# Patient Record
Sex: MALE | Race: WHITE | Employment: FULL TIME | URBAN - METROPOLITAN AREA
[De-identification: names, ages, dates, MRNs, and addresses within clinical notes are randomized per-mention and may not be internally consistent; named-entity substitution may affect disease eponyms.]

---

## 2017-01-09 ENCOUNTER — MED REC SCAN ONLY (OUTPATIENT)
Dept: FAMILY MEDICINE CLINIC | Facility: CLINIC | Age: 32
End: 2017-01-09

## 2017-01-16 NOTE — TELEPHONE ENCOUNTER
Last OV 11/28/16, No future appointments. Last rx given 11/3/16 for # 270 with one refill    Per pharmacist tech at [facility], pt still has alprazolam on file that can be refilled. New rx is not needed. Pt notified by phone and verbalized understanding.  He

## 2017-04-28 ENCOUNTER — HOSPITAL ENCOUNTER (OUTPATIENT)
Age: 32
Discharge: HOME | End: 2017-04-28
Attending: FAMILY MEDICINE
Payer: COMMERCIAL

## 2017-04-28 VITALS
OXYGEN SATURATION: 100 % | RESPIRATION RATE: 18 BRPM | DIASTOLIC BLOOD PRESSURE: 78 MMHG | BODY MASS INDEX: 28.25 KG/M2 | HEIGHT: 67 IN | WEIGHT: 180 LBS | TEMPERATURE: 101 F | SYSTOLIC BLOOD PRESSURE: 134 MMHG | HEART RATE: 100 BPM

## 2017-04-28 DIAGNOSIS — A02.9 SALMONELLA: ICD-10-CM

## 2017-04-28 DIAGNOSIS — A08.4 VIRAL GASTROENTERITIS: ICD-10-CM

## 2017-04-28 DIAGNOSIS — E87.6 HYPOKALEMIA: ICD-10-CM

## 2017-04-28 DIAGNOSIS — Z86.2 HISTORY OF IMMUNOCOMPROMISED STATE: ICD-10-CM

## 2017-04-28 DIAGNOSIS — R10.9 ABDOMINAL CRAMPING: ICD-10-CM

## 2017-04-28 DIAGNOSIS — A04.72 CLOSTRIDIUM DIFFICILE COLITIS: Primary | ICD-10-CM

## 2017-04-28 PROCEDURE — 87015 SPECIMEN INFECT AGNT CONCNTJ: CPT | Performed by: FAMILY MEDICINE

## 2017-04-28 PROCEDURE — 87077 CULTURE AEROBIC IDENTIFY: CPT | Performed by: FAMILY MEDICINE

## 2017-04-28 PROCEDURE — 99215 OFFICE O/P EST HI 40 MIN: CPT

## 2017-04-28 PROCEDURE — 85025 COMPLETE CBC W/AUTO DIFF WBC: CPT | Performed by: FAMILY MEDICINE

## 2017-04-28 PROCEDURE — 87046 STOOL CULTR AEROBIC BACT EA: CPT | Performed by: FAMILY MEDICINE

## 2017-04-28 PROCEDURE — 87177 OVA AND PARASITES SMEARS: CPT | Performed by: FAMILY MEDICINE

## 2017-04-28 PROCEDURE — 87269 GIARDIA AG IF: CPT | Performed by: FAMILY MEDICINE

## 2017-04-28 PROCEDURE — 96360 HYDRATION IV INFUSION INIT: CPT

## 2017-04-28 PROCEDURE — 85027 COMPLETE CBC AUTOMATED: CPT | Performed by: FAMILY MEDICINE

## 2017-04-28 PROCEDURE — 89055 LEUKOCYTE ASSESSMENT FECAL: CPT | Performed by: FAMILY MEDICINE

## 2017-04-28 PROCEDURE — 99204 OFFICE O/P NEW MOD 45 MIN: CPT

## 2017-04-28 PROCEDURE — 87427 SHIGA-LIKE TOXIN AG IA: CPT | Performed by: FAMILY MEDICINE

## 2017-04-28 PROCEDURE — 85007 BL SMEAR W/DIFF WBC COUNT: CPT | Performed by: FAMILY MEDICINE

## 2017-04-28 PROCEDURE — 87186 SC STD MICRODIL/AGAR DIL: CPT | Performed by: FAMILY MEDICINE

## 2017-04-28 PROCEDURE — 87209 SMEAR COMPLEX STAIN: CPT | Performed by: FAMILY MEDICINE

## 2017-04-28 PROCEDURE — 87493 C DIFF AMPLIFIED PROBE: CPT | Performed by: FAMILY MEDICINE

## 2017-04-28 PROCEDURE — 87045 FECES CULTURE AEROBIC BACT: CPT | Performed by: FAMILY MEDICINE

## 2017-04-28 PROCEDURE — 96361 HYDRATE IV INFUSION ADD-ON: CPT

## 2017-04-28 PROCEDURE — 80047 BASIC METABLC PNL IONIZED CA: CPT

## 2017-04-28 PROCEDURE — 82272 OCCULT BLD FECES 1-3 TESTS: CPT | Performed by: FAMILY MEDICINE

## 2017-04-28 RX ORDER — SODIUM CHLORIDE 9 MG/ML
1000 INJECTION, SOLUTION INTRAVENOUS ONCE
Status: COMPLETED | OUTPATIENT
Start: 2017-04-28 | End: 2017-04-28

## 2017-04-28 RX ORDER — POTASSIUM CHLORIDE 20 MEQ/1
40 TABLET, EXTENDED RELEASE ORAL ONCE
Status: COMPLETED | OUTPATIENT
Start: 2017-04-28 | End: 2017-04-28

## 2017-04-28 NOTE — ED INITIAL ASSESSMENT (HPI)
Pt with c/o diarrhea that started on Wednesday night. Pt states fevers at home. Headache started today. Denies vomiting.

## 2017-04-29 RX ORDER — METRONIDAZOLE 500 MG/1
500 TABLET ORAL 3 TIMES DAILY
Qty: 30 TABLET | Refills: 0 | Status: SHIPPED | OUTPATIENT
Start: 2017-04-29 | End: 2017-05-09

## 2017-04-29 NOTE — ED NOTES
Lab called with pos juliocesar kilgore from lab aware pt is an [place] pt.   She will follow up with them

## 2017-04-29 NOTE — ED PROVIDER NOTES
Patient Seen in: [address] [facility]    History   Patient presents with:  Nausea/Vomiting/Diarrhea (gastrointestinal)    Stated Complaint: AB PAIN    HPI  36year-old male here with intermittent abdominal cramping, especially prior to moving daily.   Escitalopram Oxalate (LEXAPRO) 20 MG Oral Tab,  Take 1 Tab by mouth daily.        Family History   Problem Relation Age of Onset   • Hypertension Mother    • Cancer Paternal Grandmother    • [other] [OTHER] Paternal Grandfather      stroke and MI ISTAT Potassium 3.2 (*)     ISTAT Chloride 97 (*)     ISTAT Ionized Calcium 1.03 (*)     All other components within normal limits   CBC W AUTO DIFF   C. DIFFICILE(TOXIGENIC)PCR   OCCULT BLOOD, STOOL   STOOL CULTURE W/SHIGATOXIN    Narrative:      The follo Comments: With solids - crackers  0.9%  NaCl infusion   Sig:   Potassium Chloride ER (K-DUR M20) CR tab 40 mEq   Sig:     Reviewed all the labs with the patient. K + was creased and this is he was treated with 40 mg of potassium immediate care.   Plan of

## 2017-04-30 NOTE — ED PROVIDER NOTES
Stool studies  positive for C. difficile. Patient was called and informed about these results. Called in Flagyl 3 times a day for the next 10 days. Patient instructed not to drink alcohol for total of 13 days.   He was also instructed to practice proper

## 2017-04-30 NOTE — ED NOTES
[facility] this am states that pt was having worse cramps. Asking if it was normal. Message left to call with an questions.

## 2017-05-01 ENCOUNTER — TELEPHONE (OUTPATIENT)
Dept: FAMILY MEDICINE CLINIC | Facility: CLINIC | Age: 32
End: 2017-05-01

## 2017-05-01 RX ORDER — CIPROFLOXACIN 500 MG/1
500 TABLET, FILM COATED ORAL 2 TIMES DAILY
Qty: 20 TABLET | Refills: 0 | Status: SHIPPED | OUTPATIENT
Start: 2017-05-01 | End: 2017-05-11

## 2017-05-01 NOTE — TELEPHONE ENCOUNTER
Pt notified by phone and verbalized understanding. Pt would like to return to work this Wednesday if possible. He requested a note for work to return. Appt scheduled for 5/2/17 to re-evaluate and determine clearance status.     Future Appointments  Date Cynthia Ramsay

## 2017-05-01 NOTE — TELEPHONE ENCOUNTER
It might be a week or so, but once he gets on the flagyll for a few days it should get better, also get an probiotic to take daily, avoid dairy fresh fruits and veggies, alsohe should use his own bathroom and avoid anyone else using the same bathroom felicia

## 2017-05-01 NOTE — TELEPHONE ENCOUNTER
At [facility] on 4/28/17, pt was diagnosed with viral gastroenteritis. He was discharged on Zofran 4 mg q8h prn, BRAT diet, and increased fluids. Stool studies positive for c diff and occult blood. Patient was started on Flagyl TID x 10 days yesterday.  Pt was advi

## 2017-05-01 NOTE — ED NOTES
Received call from Garett at [facility] laboratory. Pt stool culture +Salmonella. Dr. Mike Davis made aware.

## 2017-05-01 NOTE — ED NOTES
Patient notified of lab results and to start Cipro today. Patient is following- up with PCP tomorrow. Message  Received:  Today       Jim Rico MD  [facility]                   Inform patient that stool culture was positive for Salm

## 2017-05-02 ENCOUNTER — OFFICE VISIT (OUTPATIENT)
Dept: FAMILY MEDICINE CLINIC | Facility: CLINIC | Age: 32
End: 2017-05-02

## 2017-05-02 VITALS
DIASTOLIC BLOOD PRESSURE: 88 MMHG | RESPIRATION RATE: 16 BRPM | HEART RATE: 88 BPM | WEIGHT: 170 LBS | OXYGEN SATURATION: 98 % | TEMPERATURE: 98 F | BODY MASS INDEX: 26.37 KG/M2 | HEIGHT: 67.25 IN | SYSTOLIC BLOOD PRESSURE: 124 MMHG

## 2017-05-02 DIAGNOSIS — A04.72 C. DIFFICILE ENTERITIS: Primary | ICD-10-CM

## 2017-05-02 DIAGNOSIS — A02.9 SALMONELLA: ICD-10-CM

## 2017-05-02 DIAGNOSIS — E86.0 DEHYDRATION: ICD-10-CM

## 2017-05-02 PROCEDURE — 99214 OFFICE O/P EST MOD 30 MIN: CPT | Performed by: FAMILY MEDICINE

## 2017-05-02 RX ORDER — HYDROXYCHLOROQUINE SULFATE 200 MG/1
1 TABLET, FILM COATED ORAL DAILY
COMMUNITY
Start: 2017-04-03 | End: 2019-09-30 | Stop reason: ALTCHOICE

## 2017-05-02 RX ORDER — AZATHIOPRINE 50 MG/1
50 TABLET ORAL 2 TIMES DAILY
COMMUNITY
End: 2018-03-11

## 2017-05-02 RX ORDER — FOLIC ACID 1 MG/1
1 TABLET ORAL DAILY
COMMUNITY
Start: 2017-03-09 | End: 2017-08-27

## 2017-05-02 RX ORDER — PREDNISONE 1 MG/1
5 TABLET ORAL DAILY
COMMUNITY
End: 2018-03-11

## 2017-05-06 NOTE — ED NOTES
Patient aware of prelim result and started on cipro. Still awaiting final culture, no new information noted.   STOOL CULTURE(P)   Status: Preliminary result     Visible to patient:  Not Released                   Specimen Information: Stool; Stool

## 2017-05-15 ENCOUNTER — OFFICE VISIT (OUTPATIENT)
Dept: FAMILY MEDICINE CLINIC | Facility: CLINIC | Age: 32
End: 2017-05-15

## 2017-05-15 VITALS
HEART RATE: 104 BPM | SYSTOLIC BLOOD PRESSURE: 148 MMHG | DIASTOLIC BLOOD PRESSURE: 82 MMHG | TEMPERATURE: 99 F | WEIGHT: 172 LBS | BODY MASS INDEX: 27 KG/M2 | RESPIRATION RATE: 18 BRPM

## 2017-05-15 DIAGNOSIS — A04.72 C. DIFFICILE ENTERITIS: Primary | ICD-10-CM

## 2017-05-15 DIAGNOSIS — A02.9 SALMONELLA: ICD-10-CM

## 2017-05-15 DIAGNOSIS — A09 DIARRHEA OF INFECTIOUS ORIGIN: ICD-10-CM

## 2017-05-15 PROCEDURE — 99214 OFFICE O/P EST MOD 30 MIN: CPT | Performed by: FAMILY MEDICINE

## 2017-05-15 NOTE — PROGRESS NOTES
Brad Olson is a 36year old male. HPI:   Kuldip Woodward is here for follow up of C.  Diff treatment after he came back from [facility], he developed diarrhea, and went to the  and was diagnosed with viral gastro until his cultures came back positi exertion  GI: denies abdominal pain and denies heartburn, diarrhea has resolved having 1-2 semi formed stools daily  NEURO: denies headaches    EXAM:   /82 mmHg  Pulse 104  Temp(Src) 98.6 °F (37 °C) (Temporal)  Resp 18  Wt 172 lb  GENERAL: well devel

## 2017-05-17 ENCOUNTER — HOSPITAL ENCOUNTER (OUTPATIENT)
Age: 32
Discharge: HOME | End: 2017-05-17
Payer: COMMERCIAL

## 2017-05-17 VITALS
HEART RATE: 88 BPM | TEMPERATURE: 98 F | OXYGEN SATURATION: 99 % | RESPIRATION RATE: 16 BRPM | DIASTOLIC BLOOD PRESSURE: 86 MMHG | SYSTOLIC BLOOD PRESSURE: 142 MMHG

## 2017-05-17 DIAGNOSIS — H57.12 LEFT EYE PAIN: Primary | ICD-10-CM

## 2017-05-17 PROCEDURE — 99213 OFFICE O/P EST LOW 20 MIN: CPT

## 2017-05-17 PROCEDURE — 99212 OFFICE O/P EST SF 10 MIN: CPT

## 2017-05-17 NOTE — ED INITIAL ASSESSMENT (HPI)
Patient states he woke with left eye pain today. Does not recall injuring it or getting anything in it.

## 2017-05-18 NOTE — ED PROVIDER NOTES
Patient Seen in: [id number] [facility]    History   Patient presents with:  Eye Pain    Stated Complaint: left thigh pain    HPI    79-year-old male who presents to the immediate care with complaints of left eye pain today.   Patient is a  Take 1 Tab by mouth daily.        Family History   Problem Relation Age of Onset   • Hypertension Mother    • Cancer Paternal Grandmother    • [other] [OTHER] Paternal Grandfather      stroke and MI   • [other] [OTHER] Brother      asthma         Smoking St conjunctiva has no hemorrhage. Left conjunctiva is not injected. Left conjunctiva has no hemorrhage. No scleral icterus. Slit lamp exam:       The left eye shows no corneal abrasion, no corneal ulcer and no foreign body. Neck: Normal range of motion.  Daisy Cage

## 2017-05-22 RX ORDER — ALPRAZOLAM 0.5 MG/1
TABLET ORAL
Qty: 63 TABLET | Refills: 0 | Status: SHIPPED | OUTPATIENT
Start: 2017-05-22 | End: 2017-06-20

## 2017-06-20 RX ORDER — ALPRAZOLAM 0.5 MG/1
TABLET ORAL
Qty: 90 TABLET | Refills: 1 | Status: SHIPPED | OUTPATIENT
Start: 2017-06-20 | End: 2017-10-02

## 2017-07-24 ENCOUNTER — MED REC SCAN ONLY (OUTPATIENT)
Dept: FAMILY MEDICINE CLINIC | Facility: CLINIC | Age: 32
End: 2017-07-24

## 2017-08-27 ENCOUNTER — HOSPITAL ENCOUNTER (OUTPATIENT)
Age: 32
Discharge: HOME | End: 2017-08-27
Attending: FAMILY MEDICINE
Payer: COMMERCIAL

## 2017-08-27 ENCOUNTER — APPOINTMENT (OUTPATIENT)
Dept: GENERAL RADIOLOGY | Age: 32
End: 2017-08-27
Attending: FAMILY MEDICINE
Payer: COMMERCIAL

## 2017-08-27 VITALS
RESPIRATION RATE: 16 BRPM | WEIGHT: 170 LBS | HEIGHT: 67 IN | OXYGEN SATURATION: 100 % | HEART RATE: 109 BPM | DIASTOLIC BLOOD PRESSURE: 95 MMHG | SYSTOLIC BLOOD PRESSURE: 134 MMHG | TEMPERATURE: 99 F | BODY MASS INDEX: 26.68 KG/M2

## 2017-08-27 DIAGNOSIS — S39.012A STRAIN OF LUMBAR PARASPINOUS MUSCLE, INITIAL ENCOUNTER: Primary | ICD-10-CM

## 2017-08-27 PROCEDURE — 72110 X-RAY EXAM L-2 SPINE 4/>VWS: CPT | Performed by: FAMILY MEDICINE

## 2017-08-27 PROCEDURE — 99213 OFFICE O/P EST LOW 20 MIN: CPT

## 2017-08-27 PROCEDURE — 99214 OFFICE O/P EST MOD 30 MIN: CPT

## 2017-08-27 RX ORDER — CYCLOBENZAPRINE HCL 10 MG
10 TABLET ORAL 3 TIMES DAILY PRN
Qty: 21 TABLET | Refills: 0 | Status: SHIPPED | OUTPATIENT
Start: 2017-08-27 | End: 2017-11-29

## 2017-08-27 NOTE — ED PROVIDER NOTES
Patient Seen in: [address] [facility]    History   Patient presents with:  Back Pain    Stated Complaint: LOWER BACK PAIN     HPI    37-year-old male presents to the clinic today with chief complaints of left-sided low back pain.   Patient stat Inhale 2 puffs into the lungs every 4 (four) hours as needed for Wheezing. Amitriptyline HCl 25 MG Oral Tab,  Take 1 tablet (25 mg total) by mouth nightly.        Family History   Problem Relation Age of Onset   • Hypertension Mother    • Cancer Paternal - normal  Back exam:   Perithoracic tenderness: no,    Perilumbar tenderness: yes ,  Left  Perilumbar muscle spasm and tenderness:  Yes,  Left  SL Joint tenderness: no,    SLRs:  negative   Sacrum / Coccyx Tenderness:  Yes  Normal leg sensation:  Yes  Norm diagnosis)    Disposition:  Discharge    Follow-up:  Ratna Gagnon, [address] [facility] [id number]    In 1 week  For re-check      Medications Prescribed:  Discharge Medication List as of 8/27/2017 11:15 AM    START taking thes

## 2017-08-27 NOTE — ED INITIAL ASSESSMENT (HPI)
Pt presents to [facility] with low back pain after jumping down from truck yesterday. Pt states he landed, bent over and was unable to stand up straight. Pt states pain is low left back. Pt denies numbness or tingling.

## 2017-08-29 ENCOUNTER — MED REC SCAN ONLY (OUTPATIENT)
Dept: FAMILY MEDICINE CLINIC | Facility: CLINIC | Age: 32
End: 2017-08-29

## 2017-10-02 RX ORDER — ALPRAZOLAM 0.5 MG/1
TABLET ORAL
Qty: 90 TABLET | Refills: 0 | Status: SHIPPED
Start: 2017-10-02 | End: 2017-11-14

## 2017-10-02 NOTE — TELEPHONE ENCOUNTER
Fax received from [facility] in [place] requesting refills of alprazolam 0.5 mg tablets. Last OV 5/15/17, No future appointments.     Last rx given 6/20/17

## 2017-11-14 RX ORDER — ALPRAZOLAM 0.5 MG/1
TABLET ORAL
Qty: 90 TABLET | Refills: 1 | Status: SHIPPED | OUTPATIENT
Start: 2017-11-14 | End: 2018-01-30

## 2017-11-14 NOTE — TELEPHONE ENCOUNTER
Pt looking for refill of alprozolam, [place] advised they haven't received MD's approval. Pt is leaving town tomorrow.

## 2017-11-29 ENCOUNTER — TELEPHONE (OUTPATIENT)
Dept: FAMILY MEDICINE CLINIC | Facility: CLINIC | Age: 32
End: 2017-11-29

## 2017-11-29 ENCOUNTER — OFFICE VISIT (OUTPATIENT)
Dept: FAMILY MEDICINE CLINIC | Facility: CLINIC | Age: 32
End: 2017-11-29

## 2017-11-29 VITALS
WEIGHT: 176 LBS | RESPIRATION RATE: 22 BRPM | TEMPERATURE: 99 F | BODY MASS INDEX: 28 KG/M2 | HEART RATE: 100 BPM | DIASTOLIC BLOOD PRESSURE: 82 MMHG | SYSTOLIC BLOOD PRESSURE: 122 MMHG

## 2017-11-29 DIAGNOSIS — S33.5XXA LUMBAR SPRAIN, INITIAL ENCOUNTER: ICD-10-CM

## 2017-11-29 DIAGNOSIS — R40.0 DAYTIME SOMNOLENCE: ICD-10-CM

## 2017-11-29 DIAGNOSIS — G47.01 INSOMNIA DUE TO MEDICAL CONDITION: ICD-10-CM

## 2017-11-29 DIAGNOSIS — M62.830 LUMBAR PARASPINAL MUSCLE SPASM: ICD-10-CM

## 2017-11-29 DIAGNOSIS — L93.0 DISCOID LUPUS: ICD-10-CM

## 2017-11-29 DIAGNOSIS — N52.1 ERECTILE DISORDER DUE TO MEDICAL CONDITION IN MALE: Primary | ICD-10-CM

## 2017-11-29 PROCEDURE — 99214 OFFICE O/P EST MOD 30 MIN: CPT | Performed by: FAMILY MEDICINE

## 2017-11-29 RX ORDER — CYCLOBENZAPRINE HCL 10 MG
10 TABLET ORAL 3 TIMES DAILY PRN
Qty: 21 TABLET | Refills: 0 | Status: SHIPPED | OUTPATIENT
Start: 2017-11-29 | End: 2018-03-11

## 2017-11-29 NOTE — PROGRESS NOTES
Fabiola Woodward is a 29year old male.   HPI:   Kayla Sarabia injured his back wile helping his friend move, he felt a sharp pain in the left lower thigh and hamstring, no paresthesia, no cramping, had a similar injury 2 months ago, worse in the AM, when he get denies chest pain on exertion  GI: denies abdominal pain and denies heartburn  NEURO: denies headaches  MUSC: left sided low back pain after lifting injury  : ED   EXAM:   /82   Pulse 100   Temp 98.5 °F (36.9 °C) (Temporal)   Resp 22   Wt 176 lb

## 2017-11-29 NOTE — TELEPHONE ENCOUNTER
Pt called, is on his way home from work and was wondering if he could be seen this afternoon. Pt states he has a pain in his lower left back. Otherwise pt does not get home until after 5. Please call pt at [phone].

## 2017-11-29 NOTE — TELEPHONE ENCOUNTER
Phone call from patient. States that he \"pulled a muscle in his back this weekend\". Pain goes down left leg and is difficult to put weight on that leg. States that his happened 2 months ago. Advised - to come at 4:15pm.  Julien Pineda

## 2017-12-07 RX ORDER — AMITRIPTYLINE HYDROCHLORIDE 25 MG/1
TABLET, FILM COATED ORAL
Qty: 90 TABLET | Refills: 4 | Status: SHIPPED | OUTPATIENT
Start: 2017-12-07 | End: 2018-12-04

## 2018-01-11 ENCOUNTER — MED REC SCAN ONLY (OUTPATIENT)
Dept: FAMILY MEDICINE CLINIC | Facility: CLINIC | Age: 33
End: 2018-01-11

## 2018-01-31 RX ORDER — ALPRAZOLAM 0.5 MG/1
TABLET ORAL
Qty: 90 TABLET | Refills: 1 | Status: SHIPPED
Start: 2018-01-31 | End: 2018-03-19

## 2018-01-31 NOTE — TELEPHONE ENCOUNTER
Last refilled on 11/14/17 for # 90 with 1 rf. Last seen on 11/29/17. No future appointments. Thank you.

## 2018-03-11 ENCOUNTER — HOSPITAL ENCOUNTER (OUTPATIENT)
Age: 33
Discharge: HOME | End: 2018-03-11
Payer: COMMERCIAL

## 2018-03-11 VITALS
TEMPERATURE: 98 F | SYSTOLIC BLOOD PRESSURE: 140 MMHG | RESPIRATION RATE: 16 BRPM | OXYGEN SATURATION: 98 % | BODY MASS INDEX: 27 KG/M2 | HEART RATE: 93 BPM | DIASTOLIC BLOOD PRESSURE: 89 MMHG | WEIGHT: 175 LBS

## 2018-03-11 DIAGNOSIS — J01.90 ACUTE NON-RECURRENT SINUSITIS, UNSPECIFIED LOCATION: Primary | ICD-10-CM

## 2018-03-11 PROCEDURE — 99213 OFFICE O/P EST LOW 20 MIN: CPT

## 2018-03-11 PROCEDURE — 99214 OFFICE O/P EST MOD 30 MIN: CPT

## 2018-03-11 RX ORDER — FLUTICASONE PROPIONATE 50 MCG
2 SPRAY, SUSPENSION (ML) NASAL DAILY
Qty: 16 G | Refills: 0 | Status: SHIPPED | OUTPATIENT
Start: 2018-03-11 | End: 2018-04-10

## 2018-03-11 RX ORDER — AMOXICILLIN AND CLAVULANATE POTASSIUM 875; 125 MG/1; MG/1
1 TABLET, FILM COATED ORAL 2 TIMES DAILY
Qty: 20 TABLET | Refills: 0 | Status: SHIPPED | OUTPATIENT
Start: 2018-03-11 | End: 2018-03-21

## 2018-03-11 NOTE — ED INITIAL ASSESSMENT (HPI)
States sinus issues x 12  Days after having the flu. States now increasing pressure and sinus congestion, discharge yellowish. Fever last week;.; none this week. No cough. Taking otc meds with no relief.

## 2018-03-11 NOTE — ED PROVIDER NOTES
Patient Seen in: [address] [facility]    History   Patient presents with:  Sinusitis    Stated Complaint: HEAD ACHE/SINUS PREASURE/SORE THROAT    HPI  Brett Liliam is a 73-year-old male who comes in today complaining of flulike symptoms approximate conjunctiva and cornea clear, normal fundoscopic exam   Ears:  TM pearly gray color, mild bilateral effusion, external ear canals normal, both ears, no mastoid tenderness bilaterally   Nose:  Nares symmetrical, + erythema of bilateral nasal turbinates,+ ye medications    Amoxicillin-Pot Clavulanate 875-125 MG Oral Tab  Take 1 tablet by mouth 2 (two) times daily. Qty: 20 tablet Refills: 0    Fluticasone Propionate 50 MCG/ACT Nasal Suspension  2 sprays by Nasal route daily.   Qty: 16 g Refills: 0        I have

## 2018-03-19 RX ORDER — ALPRAZOLAM 0.5 MG/1
TABLET ORAL
Qty: 90 TABLET | Refills: 1 | Status: SHIPPED | OUTPATIENT
Start: 2018-03-19 | End: 2018-10-10

## 2018-06-27 RX ORDER — CYCLOBENZAPRINE HCL 10 MG
10 TABLET ORAL 3 TIMES DAILY PRN
Qty: 21 TABLET | Refills: 0 | Status: SHIPPED | OUTPATIENT
Start: 2018-06-27 | End: 2018-11-26

## 2018-06-27 NOTE — TELEPHONE ENCOUNTER
Pt would like a refill of the  Cyclobenzaprine HCl 10 MG Oral Tab     Pharmacy is [facility] in [place]    Please return call to [phone]

## 2018-10-10 RX ORDER — ALPRAZOLAM 0.5 MG/1
TABLET ORAL
Qty: 90 TABLET | Refills: 1 | Status: SHIPPED | OUTPATIENT
Start: 2018-10-10 | End: 2018-10-24

## 2018-10-10 NOTE — TELEPHONE ENCOUNTER
Refill request from [facility] for Alprazolam    Per note by CHRIS mcfarland to refill 90 day supply with 1 refill

## 2018-10-12 ENCOUNTER — TELEPHONE (OUTPATIENT)
Dept: FAMILY MEDICINE CLINIC | Facility: CLINIC | Age: 33
End: 2018-10-12

## 2018-10-12 NOTE — TELEPHONE ENCOUNTER
Last OV 11/29/17  Last refilled 10/10/18  Per epic, was sent to [facility].    Attempted to contact [facility], phone rang several times and no one picked up.   Will retry later

## 2018-10-12 NOTE — TELEPHONE ENCOUNTER
Called [facility] and spoke to Cite Feliciano Wagner. Advised that alprazolam 0.5mg is ready for  for patient. Left message for patient that script is ready. Advised to call office with any questions.

## 2018-10-12 NOTE — TELEPHONE ENCOUNTER
Pt needs a refill of the  ALPRAZolam 0.5 MG Oral Tab  Pt needs 1 month to [facility] in [place],  He ran out and is waiting on the mail order.    Please return call to [phone]

## 2018-10-12 NOTE — PROGRESS NOTES
Deidre Pena is a 36year old male. HPI:   Tisha Mcnamara is here for follow up of C.  Diff treatment after he came back from [facility], he developed diarrhea, and went to the  and was diagnosed with viral gastro until his cultures came back positi well otherwise  SKIN: denies any unusual skin lesions or rashes  RESPIRATORY: denies shortness of breath with exertion  CARDIOVASCULAR: denies chest pain on exertion  GI: denies abdominal pain and denies heartburn, diarrhea has pretty much stopped at this DAVID on CPAP

## 2018-10-24 RX ORDER — ALPRAZOLAM 0.5 MG/1
TABLET ORAL
Qty: 270 TABLET | Refills: 1 | Status: SHIPPED | OUTPATIENT
Start: 2018-10-24 | End: 2019-01-03

## 2018-10-24 NOTE — TELEPHONE ENCOUNTER
[facility] sent over refill request today    Per not by DS okay to send 3 month supply ( 270 tabs) and 1 RF

## 2018-11-26 NOTE — PROGRESS NOTES
Lisa Mcintyre is a 34year old male.   HPI:   Terrie Salinas is here for discussion of meds, he has been depressed and anxious and is having more issues with lack of motivation, he is tired all the time, when he gets anxiety attacks he has to take some extra X exertion  GI: denies abdominal pain and denies heartburn  NEURO: denies headaches  PSYCH: anxious depressed feels like he;s withdrawn  EXAM:   /86 (BP Location: Right arm, Patient Position: Sitting, Cuff Size: adult)   Pulse 92   Temp 99.5 °F (37.5 °

## 2018-11-30 ENCOUNTER — TELEPHONE (OUTPATIENT)
Dept: FAMILY MEDICINE CLINIC | Facility: CLINIC | Age: 33
End: 2018-11-30

## 2018-11-30 NOTE — TELEPHONE ENCOUNTER
Patient advised. Verbalizes understanding. Advised not to take more Xanax than prescibed - v.o. Dr. Josh Adams. Patient verbalizes understanding.

## 2018-11-30 NOTE — TELEPHONE ENCOUNTER
Pt called, would like to discuss side effects from Lizzie Perez 10.    Please call pt at [phone]

## 2018-11-30 NOTE — TELEPHONE ENCOUNTER
Phone call from patient. Started Paxil on Tuesday. States that his anxiety has been worse over the last 2 days. He was wondering if the medication was making this worse. Advised that this medication may take some time to be fully effective.     States

## 2018-11-30 NOTE — TELEPHONE ENCOUNTER
Break the tab in half and take it for the next week and see if it is better tolerated. It can take some time to get used to this medication and I don't want you to give up on it yet. If doing better in a week, then increase back up to full tablet.

## 2018-12-04 ENCOUNTER — TELEPHONE (OUTPATIENT)
Dept: FAMILY MEDICINE CLINIC | Facility: CLINIC | Age: 33
End: 2018-12-04

## 2018-12-04 NOTE — TELEPHONE ENCOUNTER
I told him that if that happens, he should take it in the AM instead, is it doing anything for his other Sx?

## 2018-12-04 NOTE — PROGRESS NOTES
Bradjose Olson is a 34year old male.   HPI:   Kuldip Crissy is here for discussion of meds, he has been depressed and anxious and is having more issues with lack of motivation, he is tired all the time, when he gets anxiety attacks he has to take some extra X otherwise  SKIN: denies any unusual skin lesions or rashes  RESPIRATORY: denies shortness of breath with exertion  CARDIOVASCULAR: denies chest pain on exertion  GI: denies abdominal pain and denies heartburn  NEURO: denies headaches  PSYCH: anxious depres

## 2018-12-04 NOTE — TELEPHONE ENCOUNTER
Pt states he only took it at night for 2 nights and then switched to am dosing- still waking up between 1 and 2 am in the morning.     Pt states there has been a lot that has come up since his last visit- was looking to see DS today    PEr verbal conversati

## 2018-12-04 NOTE — TELEPHONE ENCOUNTER
Pt advises that since the new meds, he is waking up at 1:00 am and isn't able to get back to sleep.   Pls call

## 2018-12-12 ENCOUNTER — TELEPHONE (OUTPATIENT)
Dept: FAMILY MEDICINE CLINIC | Facility: CLINIC | Age: 33
End: 2018-12-12

## 2018-12-12 NOTE — TELEPHONE ENCOUNTER
Pt states that on the other medication he had a lot more energy-  On the Citalopram he feels a lot more tired. Pt would like to go back to the other medication?   He states he had trouble sleeping on that medication but maybe he could get something to he

## 2018-12-12 NOTE — TELEPHONE ENCOUNTER
Pt said it is going ok with the Citalopram  He preferred the other one he was on.    Please return call to [phone]

## 2018-12-12 NOTE — TELEPHONE ENCOUNTER
PT states he has been taking the Citalopram in the AM.  PEr DS please start taking in the PM and follow up with DS    Future Appointments   Date Time Provider Rach Chou   12/20/2018  4:30 PM DO Guille Armendariz [address] [facility] [place]

## 2018-12-14 ENCOUNTER — TELEPHONE (OUTPATIENT)
Dept: FAMILY MEDICINE CLINIC | Facility: CLINIC | Age: 33
End: 2018-12-14

## 2018-12-14 RX ORDER — CITALOPRAM 20 MG/1
40 TABLET ORAL DAILY
COMMUNITY
Start: 2018-12-14 | End: 2018-12-20

## 2018-12-14 NOTE — TELEPHONE ENCOUNTER
Patient states the citalopram is not working. States no s/e but not having any improvement   Says he was much better on the paxil.     Patient aware provider out of office until Monday   Routed to Dr Santiago Gates to advise

## 2018-12-20 NOTE — PROGRESS NOTES
Rodrick Weaver is a 34year old male. HPI:   Jackson Kandace is here for discussion of meds, he had been depressed and anxious but those seem to have improvedl.  We started him on paroxetine initially but it was keeping him awake, so he swtiched it to AM and wa 96   Temp (!) 96.3 °F (35.7 °C) (Temporal)   Resp 22   Wt 155 lb 6.4 oz   BMI 24.34 kg/m²   GENERAL: well developed, well nourished,in no apparent distress  SKIN: no rashes,no suspicious lesions  HEENT: atraumatic, normocephalic,ears and throat are clear

## 2018-12-21 ENCOUNTER — TELEPHONE (OUTPATIENT)
Dept: FAMILY MEDICINE CLINIC | Facility: CLINIC | Age: 33
End: 2018-12-21

## 2018-12-21 NOTE — TELEPHONE ENCOUNTER
----- Message from Beatriz Noguera sent at 12/21/2018  4:14 PM CST -----  Contact: Patient  Patient called back regarding the rx for his Clonapin.  Advised that the message was forwarded to Dr Adenike Iverson and he is out of the office today and he has not yet responde

## 2018-12-21 NOTE — TELEPHONE ENCOUNTER
Patient advised DS out of the office until Monday and this will be addressed then. Verbalized understanding. [facility] in [place] on [address]. Patient would like to be notified when script sent.

## 2018-12-21 NOTE — TELEPHONE ENCOUNTER
FMLA paperwork complete. Patient coming 12/24 for nurse visit. Will pickup. Copy sent to scanning.      Future Appointments   Date Time Provider Rach Chou   12/24/2018 10:00 AM  [facility] [place]

## 2018-12-21 NOTE — TELEPHONE ENCOUNTER
Patient states that his job said it was okay for him to take the Clonapin. He just can't take it within 8 hours of work. [facility] IN [place] ON [address] JUST FOR TODAY.

## 2018-12-24 ENCOUNTER — NURSE ONLY (OUTPATIENT)
Dept: FAMILY MEDICINE CLINIC | Facility: CLINIC | Age: 33
End: 2018-12-24
Payer: COMMERCIAL

## 2018-12-24 DIAGNOSIS — Z00.00 ROUTINE HEALTH MAINTENANCE: ICD-10-CM

## 2018-12-24 PROCEDURE — 84443 ASSAY THYROID STIM HORMONE: CPT | Performed by: FAMILY MEDICINE

## 2018-12-24 PROCEDURE — 84439 ASSAY OF FREE THYROXINE: CPT | Performed by: FAMILY MEDICINE

## 2018-12-24 PROCEDURE — 80053 COMPREHEN METABOLIC PANEL: CPT | Performed by: FAMILY MEDICINE

## 2018-12-24 PROCEDURE — 36415 COLL VENOUS BLD VENIPUNCTURE: CPT | Performed by: FAMILY MEDICINE

## 2018-12-24 PROCEDURE — 85025 COMPLETE CBC W/AUTO DIFF WBC: CPT | Performed by: FAMILY MEDICINE

## 2018-12-24 PROCEDURE — 81001 URINALYSIS AUTO W/SCOPE: CPT | Performed by: FAMILY MEDICINE

## 2018-12-24 PROCEDURE — 80061 LIPID PANEL: CPT | Performed by: FAMILY MEDICINE

## 2018-12-24 RX ORDER — CLONAZEPAM 0.5 MG/1
TABLET ORAL
Qty: 30 TABLET | Refills: 1 | Status: SHIPPED | OUTPATIENT
Start: 2018-12-24 | End: 2019-01-03

## 2018-12-24 NOTE — PROGRESS NOTES
1 mint and 1 lavender tube collected from [facility]  Using straight needle and 1 attempt    Pt tolerated and was sent home in stable condition    PT also provided in office urine specimen

## 2018-12-26 ENCOUNTER — TELEPHONE (OUTPATIENT)
Dept: FAMILY MEDICINE CLINIC | Facility: CLINIC | Age: 33
End: 2018-12-26

## 2018-12-26 NOTE — TELEPHONE ENCOUNTER
Confirmed with [facility] lab cannot add labs to specimen. Requested if patient can come back in to provide another sample to run urine culture and GC. Patient agreed to this.   Future Appointments   Date Time Provider Rach Chou   12/27/2018 11:00 AM ELLIOTT BOWEN

## 2018-12-26 NOTE — TELEPHONE ENCOUNTER
----- Message from Adrian Manuel DO sent at 12/26/2018  9:22 AM CST -----  Can notify Elly Beckwithn his labs look pretty good cholesterol  BS, thyroid and blood count. Does he have any urinary issues?  Pain burning etc? If not then maybe we can just repea

## 2018-12-26 NOTE — TELEPHONE ENCOUNTER
Gema Rizvi notified of results listed below. Patient confirms he does have some pain and burning upon urination. Routing to Dr. Jas Mahoney.

## 2018-12-27 ENCOUNTER — NURSE ONLY (OUTPATIENT)
Dept: FAMILY MEDICINE CLINIC | Facility: CLINIC | Age: 33
End: 2018-12-27
Payer: COMMERCIAL

## 2018-12-27 DIAGNOSIS — R30.0 DYSURIA: ICD-10-CM

## 2018-12-27 DIAGNOSIS — R30.0 DYSURIA: Primary | ICD-10-CM

## 2018-12-27 DIAGNOSIS — R82.90 ABNORMAL URINALYSIS: Primary | ICD-10-CM

## 2018-12-27 DIAGNOSIS — R30.0 BURNING WITH URINATION: ICD-10-CM

## 2018-12-27 PROCEDURE — 87086 URINE CULTURE/COLONY COUNT: CPT | Performed by: FAMILY MEDICINE

## 2018-12-27 PROCEDURE — 87591 N.GONORRHOEAE DNA AMP PROB: CPT | Performed by: FAMILY MEDICINE

## 2018-12-27 PROCEDURE — 87491 CHLMYD TRACH DNA AMP PROBE: CPT | Performed by: FAMILY MEDICINE

## 2018-12-29 ENCOUNTER — TELEPHONE (OUTPATIENT)
Dept: FAMILY MEDICINE CLINIC | Facility: CLINIC | Age: 33
End: 2018-12-29

## 2018-12-29 NOTE — TELEPHONE ENCOUNTER
----- Message from Meera Morelos DO sent at 12/29/2018  8:24 AM CST -----  Can notify Della Francisca, his labs were negative for GC and Chlamydia, and his urine culture was negative as well

## 2019-01-02 ENCOUNTER — TELEPHONE (OUTPATIENT)
Dept: FAMILY MEDICINE CLINIC | Facility: CLINIC | Age: 34
End: 2019-01-02

## 2019-01-02 ENCOUNTER — MED REC SCAN ONLY (OUTPATIENT)
Dept: FAMILY MEDICINE CLINIC | Facility: CLINIC | Age: 34
End: 2019-01-02

## 2019-01-02 NOTE — TELEPHONE ENCOUNTER
He needs to see a psych, have him call [facility] and let them know what is going on so he can get in.  He could also drive there and be seen at intake

## 2019-01-02 NOTE — TELEPHONE ENCOUNTER
Pt called back to advise that he feels good on citalopram during the day but doesn;t feel the Klonopin is working at night    Pt takes it and tosses for about 2 hours and then ends up taking a Xanax.

## 2019-01-02 NOTE — TELEPHONE ENCOUNTER
Pt advised of recommendation- verbalized understanding.   PT was provided with number to [facility] [phone]

## 2019-01-02 NOTE — TELEPHONE ENCOUNTER
Pt would like to follow up with DS about his need meds ( Klonopin) , He said it is not helping at all. Would like to know what else he can do.      Please return call to [phone]

## 2019-01-02 NOTE — TELEPHONE ENCOUNTER
He needs to find out from work, if he can take either restoril at bedtime, or ambien? At bedtime, for sleep. He would have to stop the klonopin.  Also we could in crease his citalopram to 30 mg a day, 1.5 tabs and see if that helps first.

## 2019-01-03 RX ORDER — ZOLPIDEM TARTRATE 10 MG/1
10 TABLET ORAL NIGHTLY
Qty: 10 TABLET | Refills: 0 | Status: SHIPPED | OUTPATIENT
Start: 2019-01-03 | End: 2019-01-13

## 2019-01-03 RX ORDER — ZOLPIDEM TARTRATE 10 MG/1
10 TABLET ORAL NIGHTLY
Qty: 10 TABLET | Refills: 0 | Status: SHIPPED | OUTPATIENT
Start: 2019-01-03 | End: 2019-01-03

## 2019-01-03 NOTE — TELEPHONE ENCOUNTER
Pt states that HR states he can take either - they both have the same restriction as with the Klonpin - can not take with in 8 hours of work      *Please send to [address] on [address]*

## 2019-01-03 NOTE — TELEPHONE ENCOUNTER
Spoke with pt and advised of recommendations- PT states he will call work and find out if those medications are advisable. Pt states he is good on Citalopram right now.     Pt will call back

## 2019-01-03 NOTE — TELEPHONE ENCOUNTER
Sent in RX for Ambien 10 mg take 1 hour before bed on a night when he can get minimum 8 hours of sleep, gave 10 days call back regarding efficacy

## 2019-01-09 ENCOUNTER — TELEPHONE (OUTPATIENT)
Dept: FAMILY MEDICINE CLINIC | Facility: CLINIC | Age: 34
End: 2019-01-09

## 2019-01-09 NOTE — TELEPHONE ENCOUNTER
OK so for starters he can;t do that without letting me know first, because there are serious side effects associated with taking too much.  And I can;t send in  Anew prescription until the current 10 day one is up, based on calender days, because his insura

## 2019-01-09 NOTE — TELEPHONE ENCOUNTER
PT states that he started Ambien on Friday and states it is not working. Pt states he took 2 last night and was still up 7 times.     Pt states OTC sleep aid works better than this stuff ( but could not remember name)    Can pt try Restoril work approved t

## 2019-01-16 ENCOUNTER — TELEPHONE (OUTPATIENT)
Dept: FAMILY MEDICINE CLINIC | Facility: CLINIC | Age: 34
End: 2019-01-16

## 2019-01-16 RX ORDER — TEMAZEPAM 15 MG/1
15 CAPSULE ORAL NIGHTLY PRN
Qty: 15 CAPSULE | Refills: 0 | Status: SHIPPED | OUTPATIENT
Start: 2019-01-16 | End: 2020-06-10

## 2019-01-16 NOTE — TELEPHONE ENCOUNTER
PT STOPPED IN AND DROPPED OFF [facility] PAPER WORK-NEEDS TO BE UPDATED AND FAXED.     PT ALSO ADV THAT HE HAS FINISHED ALL THE AMBIEN AND WOULD LIKE TO START    Restoril    PLEASE SEND TO [address]

## 2019-01-16 NOTE — TELEPHONE ENCOUNTER
PT was advised that Restoril was sent to pharmacy- verbalized understanding    Pt was advised that paperwork has been updated.   Pt will come to  originals- they are at the     Copies made for our records and sent to scanning      Call to e

## 2019-01-22 ENCOUNTER — TELEPHONE (OUTPATIENT)
Dept: FAMILY MEDICINE CLINIC | Facility: CLINIC | Age: 34
End: 2019-01-22

## 2019-01-22 RX ORDER — CITALOPRAM 20 MG/1
40 TABLET ORAL DAILY
Qty: 180 TABLET | Refills: 3 | Status: SHIPPED | OUTPATIENT
Start: 2019-01-22 | End: 2019-05-06

## 2019-01-22 NOTE — TELEPHONE ENCOUNTER
Fax received from pharmacy for refill of Citalopram 20 mg tabs    Per DS okay for 90 day supply    Refill sent

## 2019-03-04 ENCOUNTER — OFFICE VISIT (OUTPATIENT)
Dept: FAMILY MEDICINE CLINIC | Facility: CLINIC | Age: 34
End: 2019-03-04
Payer: COMMERCIAL

## 2019-03-04 VITALS
WEIGHT: 125 LBS | HEIGHT: 66.54 IN | RESPIRATION RATE: 12 BRPM | SYSTOLIC BLOOD PRESSURE: 126 MMHG | TEMPERATURE: 100 F | BODY MASS INDEX: 19.85 KG/M2 | HEART RATE: 84 BPM | DIASTOLIC BLOOD PRESSURE: 88 MMHG

## 2019-03-04 DIAGNOSIS — J01.30 ACUTE NON-RECURRENT SPHENOIDAL SINUSITIS: Primary | ICD-10-CM

## 2019-03-04 DIAGNOSIS — J98.01 BRONCHOSPASM: ICD-10-CM

## 2019-03-04 DIAGNOSIS — R05.9 COUGH: ICD-10-CM

## 2019-03-04 DIAGNOSIS — F41.9 ANXIETY: ICD-10-CM

## 2019-03-04 PROCEDURE — 99214 OFFICE O/P EST MOD 30 MIN: CPT | Performed by: FAMILY MEDICINE

## 2019-03-04 RX ORDER — CLONAZEPAM 0.5 MG/1
TABLET ORAL
Qty: 90 TABLET | Refills: 0 | Status: SHIPPED | OUTPATIENT
Start: 2019-03-04 | End: 2019-04-01

## 2019-03-04 RX ORDER — CEFPROZIL 250 MG/1
250 TABLET, FILM COATED ORAL 2 TIMES DAILY
Qty: 20 TABLET | Refills: 0 | Status: SHIPPED | OUTPATIENT
Start: 2019-03-04 | End: 2019-03-14

## 2019-03-04 RX ORDER — METHYLPREDNISOLONE 4 MG/1
TABLET ORAL
Qty: 1 KIT | Refills: 0 | Status: SHIPPED | OUTPATIENT
Start: 2019-03-04 | End: 2019-09-30 | Stop reason: ALTCHOICE

## 2019-03-04 RX ORDER — CLONAZEPAM 0.5 MG/1
0.5 TABLET ORAL NIGHTLY
COMMUNITY
End: 2019-03-04

## 2019-03-04 RX ORDER — ALPRAZOLAM 0.5 MG/1
0.5 TABLET ORAL 3 TIMES DAILY
COMMUNITY
Start: 2017-06-20 | End: 2019-03-04

## 2019-03-04 NOTE — PROGRESS NOTES
HPI:   Fabiola Woodward is a 34year old male who presents for upper respiratory symptoms for  2  weeks. Patient reports sore throat, congestion, yellow colored nasal discharge, cough with yellow colored sputum, sinus pain. has had fever and chills and sha Alcohol/week: 0.0 oz      Comment: rare    Drug use: No        REVIEW OF SYSTEMS:   GENERAL: feels a little better today  SKIN: no rashes  EYES:denies blurred vision or double vision  HEENT: congested, sore throat PND   LUNGS: denies shortness of breath wi

## 2019-03-20 ENCOUNTER — TELEPHONE (OUTPATIENT)
Dept: FAMILY MEDICINE CLINIC | Facility: CLINIC | Age: 34
End: 2019-03-20

## 2019-03-20 NOTE — TELEPHONE ENCOUNTER
Pt called, states he is doing really good with the change in medication that was done 2 weeks ago.    Any questions please call pt at [phone]

## 2019-04-01 RX ORDER — CLONAZEPAM 0.5 MG/1
TABLET ORAL
Qty: 27 TABLET | Refills: 0 | Status: SHIPPED | OUTPATIENT
Start: 2019-04-01 | End: 2019-04-13

## 2019-04-03 RX ORDER — CLONAZEPAM 0.5 MG/1
TABLET ORAL
Qty: 27 TABLET | Refills: 0 | OUTPATIENT
Start: 2019-04-03

## 2019-04-15 RX ORDER — CLONAZEPAM 0.5 MG/1
TABLET ORAL
Qty: 27 TABLET | Refills: 0 | Status: SHIPPED
Start: 2019-04-15 | End: 2019-08-02

## 2019-04-15 RX ORDER — CLONAZEPAM 0.5 MG/1
TABLET ORAL
Qty: 270 TABLET | Refills: 0 | Status: SHIPPED | OUTPATIENT
Start: 2019-04-15 | End: 2019-04-15

## 2019-04-15 RX ORDER — CLONAZEPAM 0.5 MG/1
TABLET ORAL
Qty: 90 TABLET | Refills: 1 | Status: SHIPPED
Start: 2019-04-15 | End: 2019-04-15

## 2019-04-15 RX ORDER — CLONAZEPAM 0.5 MG/1
TABLET ORAL
Qty: 270 TABLET | Refills: 0 | Status: SHIPPED | OUTPATIENT
Start: 2019-04-15 | End: 2019-09-30

## 2019-04-15 NOTE — TELEPHONE ENCOUNTER
LOV: 3/4/19   Last Refill: refilled today    30 month supply sent to [facility]- needs to go to [facility]

## 2019-04-15 NOTE — TELEPHONE ENCOUNTER
Pt called, needs refill on clonazePAM 0.5 MG Oral Tab. Qjzxbqxo-Ggns-Pceykrzoe. Please send an additional 3 month supply of clonazePAM 0.5 MG Oral Tab to [facility] also.   Please call pt at [phone]

## 2019-05-03 ENCOUNTER — TELEPHONE (OUTPATIENT)
Dept: FAMILY MEDICINE CLINIC | Facility: CLINIC | Age: 34
End: 2019-05-03

## 2019-05-03 NOTE — TELEPHONE ENCOUNTER
Pt called, would like to change Citalopram Hydrobromide 20 MG Oral Tab and take Paxel?  Please call pt at [phone]

## 2019-05-03 NOTE — TELEPHONE ENCOUNTER
Call from patient. States he has been on citalopram for 4 months and he states it is helping, but he had more energy when he took Paxil. States he would like to go back on Paxil.  States he originally switched to citalopram because it was hard for him to sl

## 2019-05-06 RX ORDER — PAROXETINE HYDROCHLORIDE 20 MG/1
20 TABLET, FILM COATED ORAL DAILY
Qty: 90 TABLET | Refills: 3 | Status: SHIPPED | OUTPATIENT
Start: 2019-05-06 | End: 2019-09-30

## 2019-08-02 NOTE — TELEPHONE ENCOUNTER
Last refilled on 4/15/19 for # 27 with 0 refills  Last OV 3/4/19  No future appointments. Thank you.

## 2019-08-03 RX ORDER — CLONAZEPAM 0.5 MG/1
TABLET ORAL
Qty: 90 TABLET | Refills: 1 | Status: SHIPPED
Start: 2019-08-03 | End: 2019-10-04

## 2019-09-30 ENCOUNTER — TELEPHONE (OUTPATIENT)
Dept: FAMILY MEDICINE CLINIC | Facility: CLINIC | Age: 34
End: 2019-09-30

## 2019-09-30 ENCOUNTER — OFFICE VISIT (OUTPATIENT)
Dept: FAMILY MEDICINE CLINIC | Facility: CLINIC | Age: 34
End: 2019-09-30
Payer: COMMERCIAL

## 2019-09-30 VITALS
TEMPERATURE: 99 F | HEART RATE: 84 BPM | RESPIRATION RATE: 24 BRPM | DIASTOLIC BLOOD PRESSURE: 82 MMHG | BODY MASS INDEX: 23 KG/M2 | WEIGHT: 144.81 LBS | SYSTOLIC BLOOD PRESSURE: 124 MMHG

## 2019-09-30 DIAGNOSIS — L93.0 DISCOID LUPUS: Primary | ICD-10-CM

## 2019-09-30 DIAGNOSIS — R20.2 PARESTHESIAS IN RIGHT HAND: ICD-10-CM

## 2019-09-30 DIAGNOSIS — L03.811 CELLULITIS OF SCALP: ICD-10-CM

## 2019-09-30 PROCEDURE — 99214 OFFICE O/P EST MOD 30 MIN: CPT | Performed by: FAMILY MEDICINE

## 2019-09-30 RX ORDER — PAROXETINE HYDROCHLORIDE 20 MG/1
30 TABLET, FILM COATED ORAL DAILY
Qty: 90 TABLET | Refills: 3 | COMMUNITY
Start: 2019-09-30 | End: 2020-03-12

## 2019-09-30 RX ORDER — METHYLPREDNISOLONE 4 MG/1
TABLET ORAL
Qty: 1 KIT | Refills: 0 | Status: SHIPPED | OUTPATIENT
Start: 2019-09-30 | End: 2020-06-10

## 2019-09-30 RX ORDER — CEPHALEXIN 500 MG/1
500 CAPSULE ORAL 2 TIMES DAILY
Qty: 20 CAPSULE | Refills: 0 | Status: SHIPPED | OUTPATIENT
Start: 2019-09-30 | End: 2019-10-10

## 2019-09-30 NOTE — TELEPHONE ENCOUNTER
PEr DS- 6:30    Pt advised- verbalized understanding    Future Appointments   Date Time Provider Rach Chou   9/30/2019  6:30 PM Paola Granados DO [facility] [place]   10/7/2019  5:45 PM Harjinder Fisher DO [facility] [place]

## 2019-09-30 NOTE — TELEPHONE ENCOUNTER
Pt called, would like to be seen today but can only make 6:00 or after. States right hand has been going numb x's 3-4 weeks.   Please call pt at [phone]

## 2019-09-30 NOTE — PROGRESS NOTES
Kirsten Rodríguez is a 30year old male.   HPI:   Gris Purcell is here for evaluation of issues with his right 4th and 5th fingers, he states he has had  Numbness in his fingers for a month he is suing his hands all day long with rotation , he denies any elbow i GENERAL: well developed, well nourished,in no apparent distress  SKIN: has 3 pustular areas on the scalp that are erythematous  HEENT: atraumatic, normocephalic,ears and throat are clear  NECK: supple,no adenopathy,no bruits, NEG compression test  LUNGS:

## 2019-10-04 RX ORDER — CLONAZEPAM 0.5 MG/1
TABLET ORAL
Qty: 54 TABLET | Refills: 0 | Status: SHIPPED | OUTPATIENT
Start: 2019-10-04 | End: 2019-10-23

## 2019-10-04 NOTE — TELEPHONE ENCOUNTER
Last refill: 8/03/2019 #90 with 1 refill  Last Visit: 9/30/2019  Next Visit: No future appointments. Forward to Dr. Wills Grate please advise on refills. Thanks.

## 2019-10-23 RX ORDER — CLONAZEPAM 0.5 MG/1
TABLET ORAL
Qty: 54 TABLET | Refills: 0 | Status: SHIPPED | OUTPATIENT
Start: 2019-10-23 | End: 2019-11-11

## 2019-11-11 RX ORDER — CLONAZEPAM 0.5 MG/1
TABLET ORAL
Qty: 54 TABLET | Refills: 0 | Status: SHIPPED | OUTPATIENT
Start: 2019-11-11 | End: 2019-12-02

## 2019-12-02 RX ORDER — CLONAZEPAM 0.5 MG/1
TABLET ORAL
Qty: 54 TABLET | Refills: 1 | Status: SHIPPED | OUTPATIENT
Start: 2019-12-02 | End: 2020-01-10

## 2019-12-02 NOTE — TELEPHONE ENCOUNTER
Pt called he needs  clonazePAM 0.5 MG Oral Tab  (klonopin) refilled. He would like it sent to the [facility] in [place]. Thanks.

## 2020-01-10 RX ORDER — CLONAZEPAM 0.5 MG/1
TABLET ORAL
Qty: 54 TABLET | Refills: 1 | Status: SHIPPED | OUTPATIENT
Start: 2020-01-10 | End: 2020-02-17

## 2020-01-10 NOTE — TELEPHONE ENCOUNTER
Pt called, needs refill on clonazePAM 0.5 MG Oral Tab. Pharmacy [facility]. Please call pt at [phone].    Pt completely out-please call pt when we send it over to pharmacy

## 2020-01-17 ENCOUNTER — TELEPHONE (OUTPATIENT)
Dept: FAMILY MEDICINE CLINIC | Facility: CLINIC | Age: 35
End: 2020-01-17

## 2020-01-17 NOTE — TELEPHONE ENCOUNTER
Pt called, needs referral for [facility].  Dr. Shabbir Franco to pt he needs a referral for [facility] to be seen for Lupus and Dr. Romeo Carver will set up the referral.    Please call pt at [phone]

## 2020-01-17 NOTE — TELEPHONE ENCOUNTER
Patient confirmed still has [employer] PPO.  Advised to have Dr. Anusha Iqbal fax over notes/records so that we may enter the referral.

## 2020-01-20 ENCOUNTER — TELEPHONE (OUTPATIENT)
Dept: FAMILY MEDICINE CLINIC | Facility: CLINIC | Age: 35
End: 2020-01-20

## 2020-01-20 NOTE — TELEPHONE ENCOUNTER
Pt called, is wondering if we received the additional information we needed from Dr. Yovana Jeffers office.    Please call pt at [phone]

## 2020-01-20 NOTE — TELEPHONE ENCOUNTER
Pt was advised we do not have notes from Dr. Sharon Smiley yet- he will contact them and advise they need to send information over.     He also was [facility]- will get their results as well

## 2020-01-21 NOTE — TELEPHONE ENCOUNTER
PRINCESS Tavares assistant [phone]    Asked that they call back to discuss WHY we are referring pt to [facility]- nothing in notes that were sent over

## 2020-01-23 NOTE — TELEPHONE ENCOUNTER
Spoke with Brittney in Dr. Valentín Prince office- she states she will speak wit Doctor to find out the specific reasons we need to send pt to [facility]

## 2020-01-27 NOTE — TELEPHONE ENCOUNTER
Call to [facility] in referral [phone]    Started referral process over the phone    Provided RN with fax numberto send Last Rheum note, PCP note , Med list and labs.   Pt # [id number]    Dept of Rheum Ph [phone]  Fax [phone]    Pt has not see

## 2020-01-31 ENCOUNTER — TELEPHONE (OUTPATIENT)
Dept: FAMILY MEDICINE CLINIC | Facility: CLINIC | Age: 35
End: 2020-01-31

## 2020-01-31 NOTE — TELEPHONE ENCOUNTER
DIETER FROM [facility] CALLED AND ADV THAT SHE WAS STILL WAITING FOR A FAX TO COME OVER FROM THE OFFICE.     ADV DIETER THAT PT HAS UPCOMING APT ON Monday 2/3/20    WILL NOTE PTS CHART    [facility]    Fax [phone]

## 2020-02-03 ENCOUNTER — OFFICE VISIT (OUTPATIENT)
Dept: FAMILY MEDICINE CLINIC | Facility: CLINIC | Age: 35
End: 2020-02-03
Payer: COMMERCIAL

## 2020-02-03 VITALS
OXYGEN SATURATION: 99 % | WEIGHT: 162.13 LBS | BODY MASS INDEX: 25.45 KG/M2 | SYSTOLIC BLOOD PRESSURE: 136 MMHG | HEIGHT: 67 IN | TEMPERATURE: 99 F | RESPIRATION RATE: 18 BRPM | DIASTOLIC BLOOD PRESSURE: 76 MMHG | HEART RATE: 91 BPM

## 2020-02-03 DIAGNOSIS — L93.2 LUPUS PANNICULITIS: ICD-10-CM

## 2020-02-03 DIAGNOSIS — L93.0 DISCOID LUPUS: Primary | ICD-10-CM

## 2020-02-03 DIAGNOSIS — R80.0 ISOLATED PROTEINURIA WITHOUT SPECIFIC MORPHOLOGIC LESION: ICD-10-CM

## 2020-02-03 DIAGNOSIS — L65.9 ALOPECIA: ICD-10-CM

## 2020-02-03 PROCEDURE — 99214 OFFICE O/P EST MOD 30 MIN: CPT | Performed by: FAMILY MEDICINE

## 2020-02-04 NOTE — PROGRESS NOTES
Rodrigo Garcia is a 30year old male. HPI:   Ector Philip is here for evaluation prior to getting int [facility], he was seeing Rheumatology and dermatology at [facility], he was then seeing Dr. Ginny Tavares again recently.  He has been seen at [facility] and  headaches  EXT: deniea LE edema has joint pain  EXAM:   /76   Pulse 91   Temp 98.9 °F (37.2 °C) (Temporal)   Resp 18   Ht 67\"   Wt 162 lb 2 oz (73.5 kg)   SpO2 99%   BMI 25.39 kg/m²   GENERAL: well developed, well nourished,in no apparent distress

## 2020-02-04 NOTE — TELEPHONE ENCOUNTER
OV notes and Notes from Dr. Khurram Light faxed to [facility]  #-[phone]    Dept of Rheum    Phon# [phone]  Fax # [phone]    Provider Relations # [phone]

## 2020-02-15 NOTE — TELEPHONE ENCOUNTER
No refill protocol for this medication. Last refill: 1/10/2020 #54 with 1 refills  Last Visit: 2/03/2020  Next Visit: No future appointments. Forward to Dr. Jas Mahoney please advise on refills. Thanks.

## 2020-02-17 RX ORDER — CLONAZEPAM 0.5 MG/1
TABLET ORAL
Qty: 54 TABLET | Refills: 0 | Status: SHIPPED | OUTPATIENT
Start: 2020-02-17 | End: 2020-03-06

## 2020-02-21 ENCOUNTER — TELEPHONE (OUTPATIENT)
Dept: FAMILY MEDICINE CLINIC | Facility: CLINIC | Age: 35
End: 2020-02-21

## 2020-02-21 NOTE — TELEPHONE ENCOUNTER
Pt called he got a call from [address] [place] [address] they need records (biospy results) from when the PT went to the dermatologist back in [id number]. Pt does not remember who he seen, he is wanting to know if we could look and see who he was referred to?  Please advise

## 2020-02-21 NOTE — TELEPHONE ENCOUNTER
Found a report from a derm  10/18/2012    Dr. Ligia Hale the pt and advised- we googled the name and the physician is now with Jie Goodwin- I gave the pt the name and phone number- [address] [facility] [address] [place] [address]    [phone]

## 2020-03-05 NOTE — TELEPHONE ENCOUNTER
No refill protocol for this medication. Last refill: 2- #54 with 0 refills  Last Visit: 2-  Next Visit: No future appointments. Forward to Dr. Jil Subramanian please advise on refills. Thanks.

## 2020-03-06 RX ORDER — CLONAZEPAM 0.5 MG/1
TABLET ORAL
Qty: 54 TABLET | Refills: 0 | Status: SHIPPED | OUTPATIENT
Start: 2020-03-06 | End: 2020-03-12

## 2020-03-12 RX ORDER — PAROXETINE HYDROCHLORIDE 20 MG/1
30 TABLET, FILM COATED ORAL DAILY
Qty: 90 TABLET | Refills: 3 | Status: SHIPPED | OUTPATIENT
Start: 2020-03-12 | End: 2021-01-10

## 2020-03-12 RX ORDER — CLONAZEPAM 0.5 MG/1
TABLET ORAL
Qty: 54 TABLET | Refills: 0 | Status: SHIPPED | OUTPATIENT
Start: 2020-03-12 | End: 2021-06-05

## 2020-03-28 ENCOUNTER — TELEPHONE (OUTPATIENT)
Dept: FAMILY MEDICINE CLINIC | Facility: CLINIC | Age: 35
End: 2020-03-28

## 2020-03-28 RX ORDER — CLONAZEPAM 1 MG/1
TABLET ORAL
Qty: 135 TABLET | Refills: 0 | Status: SHIPPED | OUTPATIENT
Start: 2020-03-28 | End: 2020-03-28

## 2020-03-28 RX ORDER — CLONAZEPAM 0.5 MG/1
TABLET ORAL
Refills: 0 | Status: CANCELLED | OUTPATIENT
Start: 2020-03-28

## 2020-03-28 RX ORDER — CLONAZEPAM 1 MG/1
TABLET ORAL
Qty: 135 TABLET | Refills: 0 | Status: SHIPPED | OUTPATIENT
Start: 2020-03-28 | End: 2020-06-25

## 2020-03-28 NOTE — TELEPHONE ENCOUNTER
Pt has a question for DS about   clonazePAM 0.5 MG Oral Tab  He wants to know why his refills are not 90 days with [facility]. He is only getting 18 days worth.    Please return call to [phone]

## 2020-03-28 NOTE — TELEPHONE ENCOUNTER
Patient notified and verbalized understanding.     Script cancelled with Ronna Walter at [facility] resent to [address]

## 2020-03-28 NOTE — TELEPHONE ENCOUNTER
Confirmed with patient he is taking clonazepam 1 tab PM and 2 tabs PM every day, not as needed.     Would need 270 tabs for 3 month supply    Routed to DS to advise    Last OV 2/3/2020  Last refilled 3/12/2020  #54  0 refills

## 2020-06-03 ENCOUNTER — MED REC SCAN ONLY (OUTPATIENT)
Dept: FAMILY MEDICINE CLINIC | Facility: CLINIC | Age: 35
End: 2020-06-03

## 2020-06-10 ENCOUNTER — OFFICE VISIT (OUTPATIENT)
Dept: FAMILY MEDICINE CLINIC | Facility: CLINIC | Age: 35
End: 2020-06-10
Payer: COMMERCIAL

## 2020-06-10 VITALS
DIASTOLIC BLOOD PRESSURE: 90 MMHG | TEMPERATURE: 98 F | BODY MASS INDEX: 27 KG/M2 | WEIGHT: 172 LBS | SYSTOLIC BLOOD PRESSURE: 124 MMHG | HEART RATE: 84 BPM

## 2020-06-10 DIAGNOSIS — L93.0 DISCOID LUPUS: ICD-10-CM

## 2020-06-10 DIAGNOSIS — F41.9 ANXIETY: ICD-10-CM

## 2020-06-10 DIAGNOSIS — L93.2 LUPUS PANNICULITIS: Primary | ICD-10-CM

## 2020-06-10 PROCEDURE — 99214 OFFICE O/P EST MOD 30 MIN: CPT | Performed by: FAMILY MEDICINE

## 2020-06-10 NOTE — PROGRESS NOTES
Manjula Morrison is a 30year old male. 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  He had a biopsy of the apparent distress  SKIN: has an oblique healing incision  of the left posterior tricep/deltoid region, there is also a transverse incision of the right upper chest wall, both are clean and dry and approximated with a running mattress stitch   HEENT: kev

## 2020-06-25 RX ORDER — CLONAZEPAM 1 MG/1
TABLET ORAL
Qty: 135 TABLET | Refills: 2 | Status: SHIPPED | OUTPATIENT
Start: 2020-06-25 | End: 2021-01-10

## 2020-07-14 ENCOUNTER — HOSPITAL ENCOUNTER (OUTPATIENT)
Age: 35
Discharge: HOME | End: 2020-07-14
Payer: COMMERCIAL

## 2020-07-14 ENCOUNTER — APPOINTMENT (OUTPATIENT)
Dept: GENERAL RADIOLOGY | Age: 35
End: 2020-07-14
Attending: NURSE PRACTITIONER
Payer: COMMERCIAL

## 2020-07-14 ENCOUNTER — TELEPHONE (OUTPATIENT)
Dept: FAMILY MEDICINE CLINIC | Facility: CLINIC | Age: 35
End: 2020-07-14

## 2020-07-14 VITALS
DIASTOLIC BLOOD PRESSURE: 88 MMHG | TEMPERATURE: 98 F | RESPIRATION RATE: 18 BRPM | OXYGEN SATURATION: 98 % | SYSTOLIC BLOOD PRESSURE: 138 MMHG | HEART RATE: 105 BPM

## 2020-07-14 DIAGNOSIS — J06.9 VIRAL UPPER RESPIRATORY TRACT INFECTION WITH COUGH: Primary | ICD-10-CM

## 2020-07-14 PROCEDURE — U0003 INFECTIOUS AGENT DETECTION BY NUCLEIC ACID (DNA OR RNA); SEVERE ACUTE RESPIRATORY SYNDROME CORONAVIRUS 2 (SARS-COV-2) (CORONAVIRUS DISEASE [COVID-19]), AMPLIFIED PROBE TECHNIQUE, MAKING USE OF HIGH THROUGHPUT TECHNOLOGIES AS DESCRIBED BY CMS-2020-01-R: HCPCS | Performed by: NURSE PRACTITIONER

## 2020-07-14 PROCEDURE — 99203 OFFICE O/P NEW LOW 30 MIN: CPT | Performed by: NURSE PRACTITIONER

## 2020-07-14 PROCEDURE — 71046 X-RAY EXAM CHEST 2 VIEWS: CPT | Performed by: NURSE PRACTITIONER

## 2020-07-14 RX ORDER — ALBUTEROL SULFATE 90 UG/1
2 AEROSOL, METERED RESPIRATORY (INHALATION) EVERY 4 HOURS PRN
Qty: 1 INHALER | Refills: 0 | Status: SHIPPED | OUTPATIENT
Start: 2020-07-14 | End: 2020-08-13

## 2020-07-14 RX ORDER — METHYLPREDNISOLONE 4 MG/1
TABLET ORAL
Qty: 1 PACKAGE | Refills: 0 | Status: SHIPPED | OUTPATIENT
Start: 2020-07-14 | End: 2021-06-05 | Stop reason: ALTCHOICE

## 2020-07-14 NOTE — ED PROVIDER NOTES
Patient Seen in: [address]      History   Patient presents with:  Cough  Nasal Congestion  Runny Nose  Headache    Stated Complaint: cold like symptoms    79-year-old male presents today with complaints of congestion runny nose and 138/88   Pulse 105   Resp 18   Temp 97.9 °F (36.6 °C)   Temp src Oral   SpO2 98 %   O2 Device None (Room air)       Current:/88   Pulse 105   Temp 97.9 °F (36.6 °C) (Oral)   Resp 18   SpO2 98%         Physical Exam  Vitals signs and nursing note revi has had some diarrhea. Chest x-ray showed no consolidation or acute findings. Coban testing was done today and is pending. Patient be notified via phone. Encourage patient to quarantine themselves until results are given.   Patient directed to take over

## 2020-07-14 NOTE — TELEPHONE ENCOUNTER
Offered patient a DOX appointment at 240, patient declined this and stated he will go to Urgent Care.

## 2020-07-16 LAB — SARS-COV-2 RNA RESP QL NAA+PROBE: NOT DETECTED

## 2020-07-25 RX ORDER — CYCLOBENZAPRINE HCL 10 MG
TABLET ORAL
Qty: 21 TABLET | Refills: 0 | Status: SHIPPED | OUTPATIENT
Start: 2020-07-25 | End: 2021-04-06

## 2021-01-09 NOTE — TELEPHONE ENCOUNTER
PARoxetine HCl 20 MG Oral Tab        clonazePAM 1 MG Oral Tab    Pt is currently out of medication        Pt would like refill sent to   [facility], [address] [phone], [phone] no chest pain and no edema.

## 2021-01-10 RX ORDER — PAROXETINE HYDROCHLORIDE 20 MG/1
30 TABLET, FILM COATED ORAL DAILY
Qty: 90 TABLET | Refills: 3 | Status: SHIPPED | OUTPATIENT
Start: 2021-01-10 | End: 2021-06-26

## 2021-01-10 RX ORDER — CLONAZEPAM 1 MG/1
TABLET ORAL
Qty: 135 TABLET | Refills: 2 | Status: SHIPPED | OUTPATIENT
Start: 2021-01-10 | End: 2021-04-05

## 2021-04-05 RX ORDER — CLONAZEPAM 1 MG/1
TABLET ORAL
Qty: 135 TABLET | Refills: 2 | Status: SHIPPED | OUTPATIENT
Start: 2021-04-05 | End: 2021-06-26

## 2021-04-05 NOTE — TELEPHONE ENCOUNTER
PT CALLED AND ADV THAT HE TWEAKED HIS BACK OUT OVER THE WEEKEND AND WOULD LIKE TO KNOW IF DR CAN REFILL:    Cyclobenzaprine HCl 10 MG Oral Tab    [facility]    THANK YOU

## 2021-04-06 RX ORDER — CYCLOBENZAPRINE HCL 10 MG
10 TABLET ORAL 3 TIMES DAILY PRN
Qty: 21 TABLET | Refills: 0 | Status: SHIPPED | OUTPATIENT
Start: 2021-04-06 | End: 2021-06-05 | Stop reason: ALTCHOICE

## 2021-04-06 NOTE — TELEPHONE ENCOUNTER
Pt called back.  He states that he was requesting a refill for     CYCLOBENZAPRINE 10 MG Oral Tab    When he picked up the medication it was     clonazePAM 1 MG Oral Tab

## 2021-04-07 ENCOUNTER — TELEPHONE (OUTPATIENT)
Dept: FAMILY MEDICINE CLINIC | Facility: CLINIC | Age: 36
End: 2021-04-07

## 2021-04-07 NOTE — TELEPHONE ENCOUNTER
Fax from [facility] received today requesting records on medications and office visit notes    LOV 6/10/20  And copy of med list printed    Signed release received from pt. Routing to provider- please advise if additional information needs to be sent?

## 2021-04-08 ENCOUNTER — TELEPHONE (OUTPATIENT)
Dept: FAMILY MEDICINE CLINIC | Facility: CLINIC | Age: 36
End: 2021-04-08

## 2021-04-08 NOTE — TELEPHONE ENCOUNTER
Bairon Moore from [address] [place] [address] called to speak with the nurse about the records that were faxed over to her earlier today.      Please return call to [phone]

## 2021-04-08 NOTE — TELEPHONE ENCOUNTER
Pia Albright called today to go over pt med list- RN verified medications pt is currently on,. Pia Albright states that there is an open investigation for pt and his son.  She asked if there are any concerns for the pt ability to parent his children with his presc

## 2021-06-02 ENCOUNTER — TELEPHONE (OUTPATIENT)
Dept: FAMILY MEDICINE CLINIC | Facility: CLINIC | Age: 36
End: 2021-06-02

## 2021-06-02 RX ORDER — LISINOPRIL 10 MG/1
10 TABLET ORAL DAILY
Qty: 30 TABLET | Refills: 0 | COMMUNITY
Start: 2021-06-02 | End: 2021-06-25 | Stop reason: DRUGHIGH

## 2021-06-05 ENCOUNTER — OFFICE VISIT (OUTPATIENT)
Dept: FAMILY MEDICINE CLINIC | Facility: CLINIC | Age: 36
End: 2021-06-05
Payer: COMMERCIAL

## 2021-06-05 VITALS
TEMPERATURE: 98 F | HEIGHT: 67 IN | HEART RATE: 104 BPM | RESPIRATION RATE: 16 BRPM | SYSTOLIC BLOOD PRESSURE: 148 MMHG | BODY MASS INDEX: 29.66 KG/M2 | WEIGHT: 189 LBS | DIASTOLIC BLOOD PRESSURE: 90 MMHG | OXYGEN SATURATION: 99 %

## 2021-06-05 DIAGNOSIS — F41.9 ANXIETY: ICD-10-CM

## 2021-06-05 DIAGNOSIS — L93.0 DISCOID LUPUS: ICD-10-CM

## 2021-06-05 DIAGNOSIS — I10 ESSENTIAL HYPERTENSION: Primary | ICD-10-CM

## 2021-06-05 PROBLEM — Z79.899 ENCOUNTER FOR LONG-TERM (CURRENT) USE OF MEDICATIONS: Status: ACTIVE | Noted: 2021-06-03

## 2021-06-05 PROCEDURE — 3008F BODY MASS INDEX DOCD: CPT | Performed by: FAMILY MEDICINE

## 2021-06-05 PROCEDURE — 3077F SYST BP >= 140 MM HG: CPT | Performed by: FAMILY MEDICINE

## 2021-06-05 PROCEDURE — 99214 OFFICE O/P EST MOD 30 MIN: CPT | Performed by: FAMILY MEDICINE

## 2021-06-05 PROCEDURE — 3080F DIAST BP >= 90 MM HG: CPT | Performed by: FAMILY MEDICINE

## 2021-06-05 RX ORDER — MYCOPHENOLATE MOFETIL 500 MG/1
TABLET ORAL
COMMUNITY
Start: 2021-06-04

## 2021-06-05 RX ORDER — HYDROXYCHLOROQUINE SULFATE 200 MG/1
TABLET, FILM COATED ORAL
COMMUNITY
Start: 2021-06-03

## 2021-06-05 NOTE — PROGRESS NOTES
Jake Ontiveros is a 37year old male. HPI:   Alee Mcqueen is here for ER follow up after he was seen byt his rheumatologist and was noted to have an elevated BP and then sent to the ER.  He was started on Lisinopril 10 mg and his BP has come down significant normocephalic,ears and throat are clear  NECK: supple,no adenopathy,no bruits  LUNGS: clear to auscultation  CARDIO: RRR without murmur  GI: good BS's,no masses, HSM or tenderness  EXTREMITIES: no cyanosis, clubbing or edema    ASSESSMENT AND PLAN:     Ess

## 2021-06-23 ENCOUNTER — HOSPITAL ENCOUNTER (OUTPATIENT)
Age: 36
Discharge: HOME | End: 2021-06-23
Payer: COMMERCIAL

## 2021-06-23 ENCOUNTER — APPOINTMENT (OUTPATIENT)
Dept: GENERAL RADIOLOGY | Age: 36
End: 2021-06-23
Attending: NURSE PRACTITIONER
Payer: COMMERCIAL

## 2021-06-23 VITALS
TEMPERATURE: 99 F | DIASTOLIC BLOOD PRESSURE: 109 MMHG | OXYGEN SATURATION: 97 % | RESPIRATION RATE: 16 BRPM | SYSTOLIC BLOOD PRESSURE: 158 MMHG | HEART RATE: 105 BPM

## 2021-06-23 DIAGNOSIS — T14.8XXA INFECTED WOUND: ICD-10-CM

## 2021-06-23 DIAGNOSIS — S89.92XA INJURY OF LEFT KNEE, INITIAL ENCOUNTER: ICD-10-CM
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
## 2021-06-23 DIAGNOSIS — L08.9 INFECTED WOUND: ICD-10-CM

## 2021-06-23 PROCEDURE — 99213 OFFICE O/P EST LOW 20 MIN: CPT | Performed by: NURSE PRACTITIONER

## 2021-06-23 PROCEDURE — 73560 X-RAY EXAM OF KNEE 1 OR 2: CPT | Performed by: NURSE PRACTITIONER

## 2021-06-23 RX ORDER — CEPHALEXIN 500 MG/1
500 CAPSULE ORAL 3 TIMES DAILY
Qty: 30 CAPSULE | Refills: 0 | Status: SHIPPED | OUTPATIENT
Start: 2021-06-23 | End: 2021-07-03

## 2021-06-23 NOTE — ED INITIAL ASSESSMENT (HPI)
Patient states he tripped over his dog on Saturday and landed on his left knee on concrete. C/O left knee pain and abrasion.

## 2021-06-23 NOTE — ED PROVIDER NOTES
Patient Seen in: [facility]      History   Patient presents with:  Knee Pain  Rash Skin Problem    Stated Complaint: L.Knee Injury    HPI/Subjective:   42-year-old male presents today with history of fall 5 days ago.   States tripped over his d HPI.  Constitutional and vital signs reviewed. All other systems reviewed and negative except as noted above.     Physical Exam     ED Triage Vitals [06/23/21 1054]   BP (!) 158/109   Pulse 105   Resp 16   Temp 98.8 °F (37.1 °C)   Temp src Temporal   S Finalized by (CST): Lisandra Lopez MD on 6/23/2021 at 7:11 PM              MDM     Patient resents today with history of fall in which he injured his left knee and shoulder causing abrasions.   Start developing redness swelling and pus drainage from the knee in

## 2021-06-25 ENCOUNTER — OFFICE VISIT (OUTPATIENT)
Dept: FAMILY MEDICINE CLINIC | Facility: CLINIC | Age: 36
End: 2021-06-25
Payer: COMMERCIAL

## 2021-06-25 VITALS
OXYGEN SATURATION: 99 % | BODY MASS INDEX: 28.72 KG/M2 | TEMPERATURE: 98 F | SYSTOLIC BLOOD PRESSURE: 134 MMHG | DIASTOLIC BLOOD PRESSURE: 94 MMHG | HEART RATE: 101 BPM | RESPIRATION RATE: 16 BRPM | HEIGHT: 67 IN | WEIGHT: 183 LBS

## 2021-06-25 DIAGNOSIS — S40.012A CONTUSION OF LEFT SHOULDER, INITIAL ENCOUNTER: ICD-10-CM

## 2021-06-25 DIAGNOSIS — L03.116 CELLULITIS OF KNEE, LEFT: ICD-10-CM

## 2021-06-25 DIAGNOSIS — I10 ESSENTIAL HYPERTENSION: ICD-10-CM

## 2021-06-25 DIAGNOSIS — L93.0 DISCOID LUPUS: Primary | ICD-10-CM

## 2021-06-25 DIAGNOSIS — S80.02XA CONTUSION OF LEFT KNEE, INITIAL ENCOUNTER: ICD-10-CM

## 2021-06-25 PROCEDURE — 3008F BODY MASS INDEX DOCD: CPT | Performed by: FAMILY MEDICINE

## 2021-06-25 PROCEDURE — 99214 OFFICE O/P EST MOD 30 MIN: CPT | Performed by: FAMILY MEDICINE

## 2021-06-25 PROCEDURE — 3080F DIAST BP >= 90 MM HG: CPT | Performed by: FAMILY MEDICINE

## 2021-06-25 PROCEDURE — 3075F SYST BP GE 130 - 139MM HG: CPT | Performed by: FAMILY MEDICINE

## 2021-06-25 RX ORDER — LISINOPRIL 20 MG/1
20 TABLET ORAL DAILY
Qty: 90 TABLET | Refills: 1 | Status: SHIPPED | OUTPATIENT
Start: 2021-06-25 | End: 2021-07-02

## 2021-06-25 NOTE — PROGRESS NOTES
Shelly Wild is a 37year old male. HPI:   Dari Sin is here for follow up from the [address] [place] [address] where he was evaluated after a fall when his dog took his knees out from under him, and he fell landing on his left knee and shoulder.  There was an abrasion to both, of Htn  GI: denies abdominal pain and denies heartburn  NEURO: denies headaches  EXT: contusion to the left knee and left shoulder, with discharge from the knee  EXAM:   BP (!) 134/94   Pulse 101   Temp 97.7 °F (36.5 °C) (Temporal)   Resp 16   Ht 5' 7\" (1

## 2021-06-25 NOTE — TELEPHONE ENCOUNTER
Last OV 6/25/21  Last refilled:  1/10/21 paroxetine #90  3 refills to [place]  4/5/21  Clonazepam #135  2 refills  To [place]    Request coming from [facility]    Left message on voicemail/answering machine for patient to call office     Need to confirm wha

## 2021-06-25 NOTE — TELEPHONE ENCOUNTER
Patient returned call. States he is going [facility]    Spoke with Kaleigh at [address] [place] [address] and cancelled refills for Clonazepam and paroxetine    Per Kaleigh, Clonazepam was last picked up 6/21/21 for a 21 day supply.        Routed to Dr Dawit Johnson to advise on

## 2021-06-26 RX ORDER — PAROXETINE HYDROCHLORIDE 20 MG/1
30 TABLET, FILM COATED ORAL DAILY
Qty: 90 TABLET | Refills: 3 | Status: SHIPPED | OUTPATIENT
Start: 2021-06-26

## 2021-06-26 RX ORDER — CLONAZEPAM 1 MG/1
TABLET ORAL
Qty: 135 TABLET | Refills: 2 | Status: SHIPPED | OUTPATIENT
Start: 2021-06-26 | End: 2021-12-27

## 2021-07-02 RX ORDER — LISINOPRIL 20 MG/1
20 TABLET ORAL DAILY
Qty: 7 TABLET | Refills: 0 | Status: SHIPPED | OUTPATIENT
Start: 2021-07-02 | End: 2021-07-09

## 2021-07-02 NOTE — TELEPHONE ENCOUNTER
LOV 06/25/2021  BP Readings from Last 3 Encounters:  06/25/21 : (!) 134/94  06/23/21 : (!) 158/109  06/05/21 : 148/90      Last refill on 06/25/2021, for #90 tabs, with 1 refills  lisinopril 20 MG Oral Tab  Hypertension Medications Protocol Dpercl8207/02/202

## 2021-07-02 NOTE — TELEPHONE ENCOUNTER
PT CALLED AND ADV HE IS COMPLETELY OUT OF MEDS, ADV REFILLS WERE SENT ON 6/25 TO [facility] - PT ADV THAT ITS OUT ON DELIVERY BUT STILL HASN'T GOTTEN ANYTHING.    WONDERING IF HE CAN GET SHORT ORDER SENT TO [facility]     lisinopril 20 MG Oral Tab

## 2021-07-09 ENCOUNTER — MED REC SCAN ONLY (OUTPATIENT)
Dept: FAMILY MEDICINE CLINIC | Facility: CLINIC | Age: 36
End: 2021-07-09

## 2021-09-22 ENCOUNTER — TELEPHONE (OUTPATIENT)
Dept: FAMILY MEDICINE CLINIC | Facility: CLINIC | Age: 36
End: 2021-09-22

## 2021-12-27 RX ORDER — LISINOPRIL 20 MG/1
TABLET ORAL
Qty: 90 TABLET | Refills: 3 | Status: SHIPPED | OUTPATIENT
Start: 2021-12-27

## 2021-12-27 RX ORDER — CLONAZEPAM 1 MG/1
TABLET ORAL
Qty: 135 TABLET | Refills: 2 | Status: SHIPPED | OUTPATIENT
Start: 2021-12-27

## 2021-12-27 NOTE — TELEPHONE ENCOUNTER
Hypertension Medications Protocol Failed 12/26/2021 07:51 PM   Protocol Details  CMP or BMP in past 12 months    Appointment in past 6 or next 3 months    Last serum creatinine< 2.0        Routing to provider per protocol.     Lisinopril last refilled on 6

## 2022-04-07 RX ORDER — PAROXETINE HYDROCHLORIDE 20 MG/1
30 TABLET, FILM COATED ORAL DAILY
Qty: 90 TABLET | Refills: 2 | Status: SHIPPED | OUTPATIENT
Start: 2022-04-07 | End: 2022-10-04

## 2022-04-07 NOTE — TELEPHONE ENCOUNTER
Routing to provider per protocol. Last refilled on 6/26/21 for # 90 with 3 rf. Last seen on 6/25/21. No future appointments. Thank you.

## 2022-04-19 RX ORDER — CLONAZEPAM 1 MG/1
TABLET ORAL
Qty: 45 TABLET | Refills: 0 | Status: SHIPPED | OUTPATIENT
Start: 2022-04-19

## 2022-04-19 NOTE — TELEPHONE ENCOUNTER
LOV: 6/25/21   Last Refill: 12/27/21 #135 2 RF    No future appointments.     Refill from mail order is not here yet- pt needs script sent to local pharmacy

## 2022-04-19 NOTE — TELEPHONE ENCOUNTER
PT CALLED AND ADV NEEDS REFILL OF     clonazePAM 1 MG Oral Tab    ** PT ADV HE IS AWARE THAT HE HAS A REFILL VIA MAILORDER BUT HE IS OUT AND NEEDS REFILL **    PLEASE SEND TO [facility] [address] [facility]      THANK YOU

## 2022-05-03 RX ORDER — CLONAZEPAM 1 MG/1
TABLET ORAL
Qty: 135 TABLET | Refills: 1 | Status: SHIPPED | OUTPATIENT
Start: 2022-05-03

## 2022-08-09 RX ORDER — CLONAZEPAM 1 MG/1
TABLET ORAL
Qty: 135 TABLET | Refills: 1 | Status: SHIPPED | OUTPATIENT
Start: 2022-08-09

## 2022-09-26 RX ORDER — LISINOPRIL 20 MG/1
20 TABLET ORAL DAILY
Qty: 90 TABLET | Refills: 3 | Status: SHIPPED | OUTPATIENT
Start: 2022-09-26

## 2022-09-26 NOTE — TELEPHONE ENCOUNTER
LOV: 6/25/21  Last Refill:12/27/21 #90 3 RF    BP Readings from Last 2 Encounters:  06/25/21 : (!) 134/94  06/23/21 : (!) 158/109

## 2022-10-04 RX ORDER — PAROXETINE HYDROCHLORIDE 20 MG/1
TABLET, FILM COATED ORAL
Qty: 90 TABLET | Refills: 5 | Status: SHIPPED | OUTPATIENT
Start: 2022-10-04

## 2022-11-10 ENCOUNTER — APPOINTMENT (OUTPATIENT)
Dept: CT IMAGING | Age: 37
End: 2022-11-10
Attending: NURSE PRACTITIONER
Payer: COMMERCIAL

## 2022-11-10 ENCOUNTER — HOSPITAL ENCOUNTER (OUTPATIENT)
Age: 37
Discharge: HOME | End: 2022-11-10
Payer: COMMERCIAL

## 2022-11-10 ENCOUNTER — APPOINTMENT (OUTPATIENT)
Dept: GENERAL RADIOLOGY | Age: 37
End: 2022-11-10
Attending: NURSE PRACTITIONER
Payer: COMMERCIAL

## 2022-11-10 VITALS
SYSTOLIC BLOOD PRESSURE: 137 MMHG | RESPIRATION RATE: 18 BRPM | HEART RATE: 108 BPM | OXYGEN SATURATION: 99 % | DIASTOLIC BLOOD PRESSURE: 90 MMHG | TEMPERATURE: 98 F

## 2022-11-10 DIAGNOSIS — R10.9 RIGHT FLANK PAIN: Primary | ICD-10-CM

## 2022-11-10 LAB
ATRIAL RATE: 105 BPM
BASOPHILS # BLD AUTO: 0.04 X10(3) UL (ref 0–0.2)
BASOPHILS NFR BLD AUTO: 0.4 %
BUN BLD-MCNC: 14 MG/DL (ref 7–18)
CHLORIDE BLD-SCNC: 100 MMOL/L (ref 98–112)
CO2 BLD-SCNC: 24 MMOL/L (ref 21–32)
CREAT BLD-MCNC: 1.1 MG/DL
DDIMER WHOLE BLOOD: <200 NG/ML DDU (ref ?–400)
EOSINOPHIL # BLD AUTO: 0.02 X10(3) UL (ref 0–0.7)
EOSINOPHIL NFR BLD AUTO: 0.2 %
ERYTHROCYTE [DISTWIDTH] IN BLOOD BY AUTOMATED COUNT: 12.8 %
GFR SERPLBLD BASED ON 1.73 SQ M-ARVRAT: 90 ML/MIN/1.73M2 (ref 60–?)
GLUCOSE BLD-MCNC: 101 MG/DL (ref 70–99)
HCT VFR BLD AUTO: 43.6 %
HCT VFR BLD AUTO: 43.7 %
HCT VFR BLD CALC: 46 %
HGB BLD-MCNC: 15.3 G/DL
HGB BLD-MCNC: 15.8 G/DL
IMM GRANULOCYTES # BLD AUTO: 0.03 X10(3) UL (ref 0–1)
IMM GRANULOCYTES NFR BLD: 0.3 %
ISTAT IONIZED CALCIUM FOR CHEM 8: 1 MMOL/L (ref 1.12–1.32)
LYMPHOCYTES # BLD AUTO: 1.36 X10(3) UL (ref 1–4)
LYMPHOCYTES NFR BLD AUTO: 13.2 %
MCH RBC QN AUTO: 33.6 PG (ref 26–34)
MCH RBC QN AUTO: 34.2 PG (ref 26–34)
MCHC RBC AUTO-ENTMCNC: 35 G/DL (ref 31–37)
MCHC RBC AUTO-ENTMCNC: 36.2 G/DL (ref 31–37)
MCV RBC AUTO: 94.4 FL
MCV RBC AUTO: 95.8 FL (ref 80–100)
MONOCYTES # BLD AUTO: 1.26 X10(3) UL (ref 0.1–1)
MONOCYTES NFR BLD AUTO: 12.2 %
NEUTROPHILS # BLD AUTO: 7.63 X10 (3) UL (ref 1.5–7.7)
NEUTROPHILS # BLD AUTO: 7.63 X10(3) UL (ref 1.5–7.7)
NEUTROPHILS NFR BLD AUTO: 73.7 %
P AXIS: 26 DEGREES
P-R INTERVAL: 154 MS
PLATELET # BLD AUTO: 162 10(3)UL (ref 150–450)
POCT BLOOD URINE: NEGATIVE
POCT GLUCOSE URINE: NEGATIVE MG/DL
POCT INFLUENZA A: NEGATIVE
POCT INFLUENZA B: NEGATIVE
POCT LEUKOCYTE ESTERASE URINE: NEGATIVE
POCT NITRITE URINE: NEGATIVE
POCT PH URINE: 6 (ref 5–8)
POCT SPECIFIC GRAVITY URINE: 1.03
POCT UROBILINOGEN URINE: 0.2 MG/DL
POTASSIUM BLD-SCNC: 3.9 MMOL/L (ref 3.6–5.1)
Q-T INTERVAL: 350 MS
QRS DURATION: 80 MS
QTC CALCULATION (BEZET): 462 MS
R AXIS: 41 DEGREES
RBC # BLD AUTO: 4.56 X10ˆ6/UL
RBC # BLD AUTO: 4.62 X10(6)UL
SARS-COV-2 RNA RESP QL NAA+PROBE: NOT DETECTED
SODIUM BLD-SCNC: 132 MMOL/L (ref 136–145)
T AXIS: 21 DEGREES
TROPONIN I BLD-MCNC: <0.02 NG/ML
VENTRICULAR RATE: 105 BPM
WBC # BLD AUTO: 10.3 X10(3) UL (ref 4–11)
WBC # BLD AUTO: 10.3 X10ˆ3/UL (ref 4–11)

## 2022-11-10 PROCEDURE — 74176 CT ABD & PELVIS W/O CONTRAST: CPT | Performed by: NURSE PRACTITIONER

## 2022-11-10 PROCEDURE — 71046 X-RAY EXAM CHEST 2 VIEWS: CPT | Performed by: NURSE PRACTITIONER

## 2022-11-10 RX ORDER — CYCLOBENZAPRINE HCL 10 MG
10 TABLET ORAL 3 TIMES DAILY PRN
Qty: 20 TABLET | Refills: 0 | Status: SHIPPED | OUTPATIENT
Start: 2022-11-10 | End: 2022-11-17

## 2022-11-10 RX ORDER — SODIUM CHLORIDE 9 MG/ML
1000 INJECTION, SOLUTION INTRAVENOUS ONCE
Status: COMPLETED | OUTPATIENT
Start: 2022-11-10 | End: 2022-11-10

## 2022-11-10 NOTE — DISCHARGE INSTRUCTIONS
Please continue taking over-the-counter Aleve. Flexeril as needed for pain. Be advised medication to make you dizzy drowsy. No drinking alcohol or driving. Follow closely with your primary. ER if worse.

## 2022-11-10 NOTE — ED INITIAL ASSESSMENT (HPI)
Pt here w/ c/o pain to the right flank pain. Sweating. Fatigue. Onset Saturday. Sunday had vomiting and dizziness, thoses s/s have since resolved but pain remains,.

## 2022-11-29 RX ORDER — HYDROXYCHLOROQUINE SULFATE 200 MG/1
200 TABLET, FILM COATED ORAL DAILY
Qty: 30 TABLET | Refills: 2 | Status: SHIPPED | OUTPATIENT
Start: 2022-11-29 | End: 2022-12-29

## 2023-01-11 ENCOUNTER — PATIENT MESSAGE (OUTPATIENT)
Dept: FAMILY MEDICINE CLINIC | Facility: CLINIC | Age: 38
End: 2023-01-11

## 2023-01-11 NOTE — TELEPHONE ENCOUNTER
From: [facility]  To: Joon Frazier DO  Sent: 1/11/2023 2:17 PM CST  Subject: Medication refill    It appears that you do not want to refill my lupus medications. I have no problem not taking them, definitely not the first time. I can't see my normal rheumatologist and have no interest in starting over again, been there and done that. I just want to make sure we are clear on that. I hope you are having a great day!

## 2023-02-18 NOTE — TELEPHONE ENCOUNTER
Patient requests refill    clonazePAM 1 MG Oral Tab #135  TAKE ONE-HALF (1/2) TABLET (0.5 MG) IN THE MORNING, THEN TAKE 1 TABLET (1 MG) AT BEDTIME    [facility]    Patient will run out this weekend but is ok waiting until Monday if on call provider is uncomfortable refilling    Please adv  Thank you

## 2023-02-18 NOTE — TELEPHONE ENCOUNTER
No refill protocol for this medication. Last refill: 8/09/2022 #135 with 1 refill  Last Visit: 6/25/2021  Next Visit: No future appointments. Forward to Dr. An De La Rosa please advise on refills. Pt has not been seen since 2021, covering provider will not fill this. Thanks.

## 2023-02-19 RX ORDER — CLONAZEPAM 1 MG/1
TABLET ORAL
Qty: 135 TABLET | Refills: 1 | Status: SHIPPED | OUTPATIENT
Start: 2023-02-19

## 2023-03-13 ENCOUNTER — TELEPHONE (OUTPATIENT)
Dept: FAMILY MEDICINE CLINIC | Facility: CLINIC | Age: 38
End: 2023-03-13

## 2023-03-13 RX ORDER — CYCLOBENZAPRINE HCL 10 MG
10 TABLET ORAL NIGHTLY
Qty: 10 TABLET | Refills: 0 | Status: SHIPPED | OUTPATIENT
Start: 2023-03-13 | End: 2023-03-23

## 2023-03-13 NOTE — TELEPHONE ENCOUNTER
Patient states he tweaked his back on Friday    He has tried Tylenol, Ibuprofen, and Aleve with not much relief    He is requesting flexeril as he has had success in the past with this medication    Please adv  Thank you      [facility] [address] [facility]

## 2023-06-06 RX ORDER — PAROXETINE HYDROCHLORIDE 20 MG/1
30 TABLET, FILM COATED ORAL DAILY
Qty: 45 TABLET | Refills: 0 | Status: SHIPPED | OUTPATIENT
Start: 2023-06-06 | End: 2023-07-06

## 2023-06-06 RX ORDER — CLONAZEPAM 1 MG/1
TABLET ORAL
Qty: 45 TABLET | Refills: 0 | Status: SHIPPED | OUTPATIENT
Start: 2023-06-06

## 2023-06-06 NOTE — TELEPHONE ENCOUNTER
Patient requests refill    PAROXETINE 20 MG Oral Tab    clonazePAM 1 MG Oral Tab      [facility]

## 2023-07-10 RX ORDER — PAROXETINE HYDROCHLORIDE 20 MG/1
30 TABLET, FILM COATED ORAL DAILY
Qty: 45 TABLET | Refills: 0 | Status: SHIPPED | OUTPATIENT
Start: 2023-07-10

## 2023-07-10 NOTE — TELEPHONE ENCOUNTER
Routing to provider per protocol. PARoxetine 20 MG Oral Tab   Last refilled on 6/6/23 for #45  with 0 rf. Last labs 11/10/22. Last seen on 6/25/21. No future appointments. Thank you.

## 2023-07-10 NOTE — TELEPHONE ENCOUNTER
Last refill, has not been seen In 2 years       Left detailed vm with note above.  Needs to schedule appt before next refill

## 2023-07-12 ENCOUNTER — HOSPITAL ENCOUNTER (OUTPATIENT)
Age: 38
Discharge: HOME | End: 2023-07-12
Payer: OTHER MISCELLANEOUS

## 2023-07-12 ENCOUNTER — TELEPHONE (OUTPATIENT)
Dept: FAMILY MEDICINE CLINIC | Facility: CLINIC | Age: 38
End: 2023-07-12

## 2023-07-12 ENCOUNTER — APPOINTMENT (OUTPATIENT)
Dept: GENERAL RADIOLOGY | Age: 38
End: 2023-07-12
Attending: NURSE PRACTITIONER
Payer: OTHER MISCELLANEOUS

## 2023-07-12 VITALS
TEMPERATURE: 97 F | DIASTOLIC BLOOD PRESSURE: 104 MMHG | RESPIRATION RATE: 20 BRPM | HEART RATE: 126 BPM | SYSTOLIC BLOOD PRESSURE: 130 MMHG | OXYGEN SATURATION: 96 %

## 2023-07-12 DIAGNOSIS — S22.32XA CLOSED FRACTURE OF ONE RIB OF LEFT SIDE, INITIAL ENCOUNTER: Primary | ICD-10-CM

## 2023-07-12 PROCEDURE — 71101 X-RAY EXAM UNILAT RIBS/CHEST: CPT | Performed by: NURSE PRACTITIONER

## 2023-07-12 PROCEDURE — 99213 OFFICE O/P EST LOW 20 MIN: CPT | Performed by: NURSE PRACTITIONER

## 2023-07-12 RX ORDER — TRAMADOL HYDROCHLORIDE 50 MG/1
50 TABLET ORAL EVERY 6 HOURS PRN
Qty: 20 TABLET | Refills: 0 | Status: SHIPPED | OUTPATIENT
Start: 2023-07-12 | End: 2023-07-17

## 2023-07-12 NOTE — TELEPHONE ENCOUNTER
PATIENT SCHEDULED TO SEE DR PEREZ ON 7-. HE CALLED ASKING IF DR PEREZ HAD ANY THING AVAILABLE AT A LATER TIME TODAY. PATIENT INFORMED THERE IS NO AVAILABILITY LATER TODAY. PATIENT WAS OFFERED FRIDAY 1020. PATIENT STATED HE COULD NOT MAKE FRIDAY AND WILL BE IN TODAY BUT A FEW MINUTES LATE. PATIENT NEVER SHOWED UP. I CALLED PATIENT BUT HIS VM NOT SET UP.

## 2023-07-12 NOTE — DISCHARGE INSTRUCTIONS
Rest and apply ice 20 minutes on and then 40 minutes off several times a day. Take Tylenol and/or ibuprofen as needed for mild to moderate pain. Use the Tramadol as needed for more severe pain. Do not drive or work on this medication. Make sure to take frequent deep breaths to help prevent pneumonia. Follow up with your PCP in 1 week.

## 2023-09-30 RX ORDER — LISINOPRIL 20 MG/1
20 TABLET ORAL DAILY
Qty: 30 TABLET | Refills: 0 | Status: SHIPPED | OUTPATIENT
Start: 2023-09-30 | End: 2023-10-02

## 2023-09-30 NOTE — TELEPHONE ENCOUNTER
Pt failed refill protocol for the following reasons:    Hypertension Medications Protocol Drrnry4509/30/2023 03:15 AM   Protocol Details CMP or BMP in past 12 months    Appointment in past 6 or next 3 months    Last serum creatinine< 2.0       Last refill: 9/26/23  Last appt: 6/25/23  Next appt: No future appointments. Forward to Dr. Haile Parish, please advise on refills. Thank you.

## 2023-10-02 RX ORDER — LISINOPRIL 20 MG/1
20 TABLET ORAL DAILY
Qty: 30 TABLET | Refills: 0 | Status: SHIPPED | OUTPATIENT
Start: 2023-10-02 | End: 2023-10-02

## 2023-10-02 RX ORDER — PAROXETINE HYDROCHLORIDE 20 MG/1
30 TABLET, FILM COATED ORAL DAILY
Qty: 45 TABLET | Refills: 0 | Status: SHIPPED | OUTPATIENT
Start: 2023-10-02

## 2023-10-02 RX ORDER — LISINOPRIL 20 MG/1
20 TABLET ORAL DAILY
Qty: 30 TABLET | Refills: 0 | Status: SHIPPED | OUTPATIENT
Start: 2023-10-02 | End: 2023-11-01

## 2023-10-02 RX ORDER — LISINOPRIL 20 MG/1
20 TABLET ORAL DAILY
Qty: 90 TABLET | Refills: 0 | OUTPATIENT
Start: 2023-10-02

## 2023-10-02 NOTE — TELEPHONE ENCOUNTER
Hypertension Medications Protocol Nheiwg01/02/2023 10:19 AM   Protocol Details CMP or BMP in past 12 months    Appointment in past 6 or next 3 months    Last serum creatinine< 2.0        BP Readings from Last 3 Encounters:  07/12/23 : (!) 130/104  11/10/22 : 137/90  06/25/21 : (!) 134/94    Routing to provider per protocol. lisinopril 20 MG Oral Tab   Last refilled on 9/30/23 for #30  with 0 rf. Last labs 4/26/22. Last seen on 6/25/21. Routing to provider per protocol. PARoxetine 20 MG Oral Tab   Last refilled on 7/10/23 for #45 with 0 rf. Last labs 11/10/22. Last seen on 6/25/21. No future appointments. Thank you.

## 2023-10-02 NOTE — TELEPHONE ENCOUNTER
LOV: 6/25/21   Last Refill:   Filled today for 30 day fill    Pt has not been seen in 2 years- pt needs office visit for futher refills    Future Appointments   Date Time Provider Rach Chou   10/11/2023  2:40 PM Sikic, Kandi Osgood, [facility] ELLIOTT Borja

## 2023-10-02 NOTE — TELEPHONE ENCOUNTER
Advised patient of notes below. Patient verbalized understanding. No further questions at this time.     Future Appointments   Date Time Provider Rach Chou   10/11/2023  2:40 PM Maddison Fisher, [facility] [employer] as Per Webster

## 2023-10-02 NOTE — TELEPHONE ENCOUNTER
PT REQUESTING 90 DAY SUPPLY    LOV 06/25/21  Last labs 01/25/22  Last refill on 10/02/23, for #30 tabs, with 0 refills  lisinopril 20 MG Oral Tab   Hypertension Medications Protocol Xbtdax6809/30/2023 08:59 AM   Protocol Details CMP or BMP in past 12 months    Appointment in past 6 or next 3 months    Last serum creatinine< 2.0     No future appointments.       Ceci Martinez, DO   to Shweta Reeves, [facility]     9/30/23  8:38 AM  LAST REFILL, HAS NOT BEEN SEEN SINCE 2021      DENIED - DUE for appt

## 2023-10-11 ENCOUNTER — OFFICE VISIT (OUTPATIENT)
Dept: FAMILY MEDICINE CLINIC | Facility: CLINIC | Age: 38
End: 2023-10-11
Payer: COMMERCIAL

## 2023-10-11 VITALS
HEIGHT: 69 IN | SYSTOLIC BLOOD PRESSURE: 138 MMHG | RESPIRATION RATE: 16 BRPM | WEIGHT: 198 LBS | BODY MASS INDEX: 29.33 KG/M2 | TEMPERATURE: 97 F | HEART RATE: 117 BPM | DIASTOLIC BLOOD PRESSURE: 86 MMHG | OXYGEN SATURATION: 98 %

## 2023-10-11 DIAGNOSIS — Z00.00 ROUTINE HEALTH MAINTENANCE: Primary | ICD-10-CM

## 2023-10-11 DIAGNOSIS — E01.0 THYROMEGALY: ICD-10-CM

## 2023-10-11 PROBLEM — G47.09 OTHER INSOMNIA: Status: ACTIVE | Noted: 2023-10-11

## 2023-10-11 PROCEDURE — 3008F BODY MASS INDEX DOCD: CPT | Performed by: FAMILY MEDICINE

## 2023-10-11 PROCEDURE — 99395 PREV VISIT EST AGE 18-39: CPT | Performed by: FAMILY MEDICINE

## 2023-10-11 PROCEDURE — 3075F SYST BP GE 130 - 139MM HG: CPT | Performed by: FAMILY MEDICINE

## 2023-10-11 PROCEDURE — 3079F DIAST BP 80-89 MM HG: CPT | Performed by: FAMILY MEDICINE

## 2023-10-11 RX ORDER — TEMAZEPAM 15 MG/1
15 CAPSULE ORAL NIGHTLY PRN
Qty: 14 CAPSULE | Refills: 0 | Status: SHIPPED | OUTPATIENT
Start: 2023-10-11 | End: 2023-10-25

## 2023-10-11 RX ORDER — CEPHALEXIN 500 MG/1
500 CAPSULE ORAL 2 TIMES DAILY
Qty: 20 CAPSULE | Refills: 0 | Status: SHIPPED | OUTPATIENT
Start: 2023-10-11 | End: 2023-10-21

## 2023-10-11 RX ORDER — PREDNISONE 10 MG/1
TABLET ORAL
Qty: 18 TABLET | Refills: 0 | Status: SHIPPED | OUTPATIENT
Start: 2023-10-11

## 2023-10-11 RX ORDER — LISINOPRIL 20 MG/1
20 TABLET ORAL DAILY
Qty: 90 TABLET | Refills: 2 | Status: SHIPPED | OUTPATIENT
Start: 2023-10-11 | End: 2024-01-09

## 2023-10-16 DIAGNOSIS — E04.9 ENLARGED THYROID: Primary | ICD-10-CM

## 2023-10-28 ENCOUNTER — NURSE ONLY (OUTPATIENT)
Dept: FAMILY MEDICINE CLINIC | Facility: CLINIC | Age: 38
End: 2023-10-28

## 2023-10-28 DIAGNOSIS — Z00.00 ROUTINE HEALTH MAINTENANCE: ICD-10-CM

## 2023-10-28 LAB
ALBUMIN SERPL-MCNC: 4.1 G/DL (ref 3.4–5)
ALBUMIN/GLOB SERPL: 1.1 {RATIO} (ref 1–2)
ALP LIVER SERPL-CCNC: 63 U/L
ALT SERPL-CCNC: 56 U/L
ANION GAP SERPL CALC-SCNC: 4 MMOL/L (ref 0–18)
AST SERPL-CCNC: 26 U/L (ref 15–37)
BASOPHILS # BLD AUTO: 0.04 X10(3) UL (ref 0–0.2)
BASOPHILS NFR BLD AUTO: 0.8 %
BILIRUB SERPL-MCNC: 0.5 MG/DL (ref 0.1–2)
BUN BLD-MCNC: 12 MG/DL (ref 7–18)
CALCIUM BLD-MCNC: 9 MG/DL (ref 8.5–10.1)
CHLORIDE SERPL-SCNC: 110 MMOL/L (ref 98–112)
CHOLEST SERPL-MCNC: 205 MG/DL (ref ?–200)
CO2 SERPL-SCNC: 25 MMOL/L (ref 21–32)
CREAT BLD-MCNC: 1.11 MG/DL
EGFRCR SERPLBLD CKD-EPI 2021: 88 ML/MIN/1.73M2 (ref 60–?)
EOSINOPHIL # BLD AUTO: 0.21 X10(3) UL (ref 0–0.7)
EOSINOPHIL NFR BLD AUTO: 4 %
ERYTHROCYTE [DISTWIDTH] IN BLOOD BY AUTOMATED COUNT: 12.1 %
FASTING PATIENT LIPID ANSWER: YES
FASTING STATUS PATIENT QL REPORTED: YES
GLOBULIN PLAS-MCNC: 3.7 G/DL (ref 2.8–4.4)
GLUCOSE BLD-MCNC: 94 MG/DL (ref 70–99)
HCT VFR BLD AUTO: 45.6 %
HDLC SERPL-MCNC: 38 MG/DL (ref 40–59)
HGB BLD-MCNC: 15.1 G/DL
IMM GRANULOCYTES # BLD AUTO: 0.01 X10(3) UL (ref 0–1)
IMM GRANULOCYTES NFR BLD: 0.2 %
LDLC SERPL CALC-MCNC: 128 MG/DL (ref ?–100)
LYMPHOCYTES # BLD AUTO: 1.39 X10(3) UL (ref 1–4)
LYMPHOCYTES NFR BLD AUTO: 26.2 %
MCH RBC QN AUTO: 32.1 PG (ref 26–34)
MCHC RBC AUTO-ENTMCNC: 33.1 G/DL (ref 31–37)
MCV RBC AUTO: 97 FL
MONOCYTES # BLD AUTO: 0.64 X10(3) UL (ref 0.1–1)
MONOCYTES NFR BLD AUTO: 12.1 %
NEUTROPHILS # BLD AUTO: 3.01 X10 (3) UL (ref 1.5–7.7)
NEUTROPHILS # BLD AUTO: 3.01 X10(3) UL (ref 1.5–7.7)
NEUTROPHILS NFR BLD AUTO: 56.7 %
NONHDLC SERPL-MCNC: 167 MG/DL (ref ?–130)
OSMOLALITY SERPL CALC.SUM OF ELEC: 288 MOSM/KG (ref 275–295)
PLATELET # BLD AUTO: 199 10(3)UL (ref 150–450)
POTASSIUM SERPL-SCNC: 4.5 MMOL/L (ref 3.5–5.1)
PROT SERPL-MCNC: 7.8 G/DL (ref 6.4–8.2)
RBC # BLD AUTO: 4.7 X10(6)UL
SODIUM SERPL-SCNC: 139 MMOL/L (ref 136–145)
T4 FREE SERPL-MCNC: 0.9 NG/DL (ref 0.8–1.7)
TRIGL SERPL-MCNC: 218 MG/DL (ref 30–149)
TSI SER-ACNC: 0.85 MIU/ML (ref 0.36–3.74)
VLDLC SERPL CALC-MCNC: 40 MG/DL (ref 0–30)
WBC # BLD AUTO: 5.3 X10(3) UL (ref 4–11)

## 2023-10-28 PROCEDURE — 84439 ASSAY OF FREE THYROXINE: CPT | Performed by: FAMILY MEDICINE

## 2023-10-28 PROCEDURE — 84443 ASSAY THYROID STIM HORMONE: CPT | Performed by: FAMILY MEDICINE

## 2023-10-28 PROCEDURE — 85025 COMPLETE CBC W/AUTO DIFF WBC: CPT | Performed by: FAMILY MEDICINE

## 2023-10-28 PROCEDURE — 80053 COMPREHEN METABOLIC PANEL: CPT | Performed by: FAMILY MEDICINE

## 2023-10-28 PROCEDURE — 80061 LIPID PANEL: CPT | Performed by: FAMILY MEDICINE

## 2023-10-28 NOTE — PROGRESS NOTES
Patient presented for lab work ordered by Marc Nichols. One mint and one lavender tubes drawn with a straight needle x one attempt in right ac space. Pt tolerated procedure well and left in stable condition.

## 2023-10-30 ENCOUNTER — TELEPHONE (OUTPATIENT)
Dept: FAMILY MEDICINE CLINIC | Facility: CLINIC | Age: 38
End: 2023-10-30

## 2023-10-30 NOTE — TELEPHONE ENCOUNTER
----- Message from Tony Holliday DO sent at 10/30/2023 10:36 AM CDT -----  PLEASE NOTIFY LACEY, THAT EXCEPT FOR HIS CHOLESTEROL THE REST OF HIS LABS LOOKED GREAT. BS/Kidney/ Liver function/ blood count and thyroid all looked great. Lets work on watching out fatty and fried. foods.  and he should be good

## 2023-11-16 RX ORDER — LISINOPRIL 20 MG/1
20 TABLET ORAL DAILY
Qty: 90 TABLET | Refills: 2 | OUTPATIENT
Start: 2023-11-16 | End: 2024-02-14

## 2023-11-21 RX ORDER — PAROXETINE HYDROCHLORIDE 20 MG/1
30 TABLET, FILM COATED ORAL DAILY
Qty: 45 TABLET | Refills: 5 | Status: SHIPPED | OUTPATIENT
Start: 2023-11-21

## 2024-01-10 ENCOUNTER — TELEPHONE (OUTPATIENT)
Dept: FAMILY MEDICINE CLINIC | Facility: CLINIC | Age: 39
End: 2024-01-10

## 2024-01-10 NOTE — TELEPHONE ENCOUNTER
Lab Frequency Next Occurrence   US THYROID (CPT=76536) Once 10/16/2023     Letter mailed to patient reminding them they have outstanding orders.

## 2024-08-01 RX ORDER — LISINOPRIL 20 MG/1
20 TABLET ORAL DAILY
Qty: 90 TABLET | Refills: 2 | Status: SHIPPED | OUTPATIENT
Start: 2024-08-01

## 2024-08-01 NOTE — TELEPHONE ENCOUNTER
Hypertension Medications Protocol Iabaaa1508/01/2024 02:04 PM   Protocol Details CMP or BMP in past 12 months    Last BP reading less than 140/90    In person appointment or virtual visit in the past 12 mos or appointment in next 3 mos    EGFRCR or GFRNAA > 50      Refilled per protocol  lisinopril 20 MG Oral Tab   Last refilled on 10/11/23 #90 with 2 rf.  LOV- 10/11/23  Last labs- 10/28/23    Sent to pharmacy

## 2024-10-24 RX ORDER — PAROXETINE 20 MG/1
30 TABLET, FILM COATED ORAL DAILY
Qty: 45 TABLET | Refills: 5 | Status: SHIPPED | OUTPATIENT
Start: 2024-10-24

## 2024-10-24 NOTE — TELEPHONE ENCOUNTER
Psychiatric Non-Scheduled (Anti-Anxiety) Gvizfl78/24/2024 09:18 AM   Protocol Details In person appointment or virtual visit in the past 6 mos or appointment in next 3 mos    Depression Screening completed within the past 12 months      Routing to provider per protocol.   PARoxetine 20 MG Oral Tab   Last refilled on 11/21/23 for #45  with 5 rf.   Last labs 10/28/23.   Last seen on 10/11/23.     No future appointments.       Thank you.     SENDING TO A COVERING PROVIDER, THANK YOU.

## 2024-12-02 NOTE — TELEPHONE ENCOUNTER
Duplicate - prescription request refused.   Mail order pharmacy sent over refill request    Paroxetine 20mg and Clonazepam 0.5mg    LOV: 2/3/20   Last Refill:  Clonazepam 3/6/20 #54 0 RF *local pharmacy  Paroxetine 9/30/19  #90 3 RF    No future appointments.   Please sent to mail order

## 2025-04-25 RX ORDER — LISINOPRIL 20 MG/1
20 TABLET ORAL DAILY
Qty: 90 TABLET | Refills: 2 | Status: SHIPPED | OUTPATIENT
Start: 2025-04-25

## 2025-04-25 NOTE — TELEPHONE ENCOUNTER
Hypertension Medications Protocol Ewicpj8104/25/2025 10:19 AM   Protocol Details CMP or BMP in past 12 months    In person appointment or virtual visit in the past 12 mos or appointment in next 3 mos    EGFRCR or GFRNAA > 50    Last BP reading less than 140/90    Medication is active on med list      Routing to provider per protocol.   LISINOPRIL 20 MG Oral Tab   Last refilled on 8/1/24 for #90  with 2 rf.   Last labs 10/28/23.   Last seen on 10/11/23.     No future appointments.       Thank you.

## (undated) NOTE — LETTER
Date & Time: 7/12/2023, 3:50 PM  Patient: Diane Cortez  Encounter Provider(s):    Imtiaz Leong.MARA       To Whom It May Concern:    Lynnette Love was seen and treated in our department on 7/12/2023. He can return to work with these limitations: light duty until cleared by occupational medicine .     If you have any questions or concerns, please do not hesitate to call.        _____________________________  Physician/APC Signature

## (undated) NOTE — LETTER
Dion Daniel   [address]  [facility] [address]           Dear Dion Daniel     Our records indicate that you have outstanding lab work and or testing that was ordered for you and has not yet been completed:  Lab Frequency Next Occurrence   US THYROID (CPT=76536) Once 10/16/2023      To provide you with the best possible care, please complete these orders at your earliest convenience. If you have recently completed these orders please disregard this letter.     If you have any questions please call the office at [phone].     Thank you,     [facility]

## (undated) NOTE — MR AVS SNAPSHOT
[address] [facility] [address] [phone]               Thank you for choosing us for your health care visit with Opal Mar DO.   We are glad to serve you and happy to provide you with this sum Commonly known as:  DELTASONE           UNKNOWN TO PATIENT   Lupus med                   MyChart               Educational Information     Your blood pressure indicates you may be at-risk for Hypertension.    Please consider the following Lifestyle Modifica

## (undated) NOTE — LETTER
[facility] [place]  [address] [address]  Dept: [phone]  Dept Fax: [phone]         April 28, 2017    Patient: Karina Moreno   YOB: 1985   Date of Visit: 4/28/2017       To Whom It May Concern:    A

## (undated) NOTE — ED AVS SNAPSHOT
THE [facility] in [facility] [address] [address]    Phone:  [phone]    Fax:  [phone]             Leopoldojenniferadal Farris   MRN: [id number]    Department:  THE [facility] in [place]   Date of Visit:  4/28/2017 Zofran 4 mg every 8 hours as needed for nausea or vomiting   Adequate hydration with Pedialyte, free water or popsicles advised   Avoid Gatorade due to dyes and high amounts of sugar and salts.    Potassium rich foods discussed   Continue to monitor input a You were examined and treated today on an urgent basis only. This was not a substitute for ongoing medical care. Often, one Immediate Care visit does not uncover every injury or illness.  If you have been referred to a primary care or a specialist [address]ia [address] ([address]) [address]  [place] ([facility]) [phone] [address] ([address]) [phone]                Additional Information       We are concerned for your o

## (undated) NOTE — MR AVS SNAPSHOT
[address] [facility] [address]  [phone]               Thank you for choosing us for your health care visit with Meera Morelos DO.   We are glad to serve you and happy to provide you with this sum escitalopram 20 MG Tabs   Take 1 Tab by mouth daily. Commonly known as:  LEXAPRO           folic acid 1 MG Tabs   Take 1 tablet by mouth daily.    Commonly known as:  FOLVITE           Hydroxychloroquine Sulfate 200 MG Tabs   Take 1 tablet by mouth daily Get your heart pumping – brisk walking, biking, swimming Even 10 minute increments are effective and add up over the week   2 ½ hours per week – spread out over time Use a richard to keep you motivated   Don’t forget strength training with weights and resist

## (undated) NOTE — ED AVS SNAPSHOT
[facility] [address] [address]    Phone:  [phone]    Fax:  [phone]           Mr. Jodee Dominguez   MRN: [id number]    Department:  [facility]   Date of Visit:  5/17/2017 Si usted tiene algun problema con lorenzo sequimiento, por favor llame a nuestro adminstrador de sarai al [phone]. Expect to receive an electronic request (by e-mail or text) to complete a self-assessment the day after your visit.   You may also recei [facility] [address] [facility] ([facility]) [address] ([address]) [address]  ([address]) [address] [facility]